# Patient Record
Sex: MALE | Race: WHITE | Employment: FULL TIME | URBAN - METROPOLITAN AREA
[De-identification: names, ages, dates, MRNs, and addresses within clinical notes are randomized per-mention and may not be internally consistent; named-entity substitution may affect disease eponyms.]

---

## 2017-01-25 DIAGNOSIS — Z79.891 LONG-TERM CURRENT USE OF OPIATE ANALGESIC: Chronic | ICD-10-CM

## 2017-01-25 DIAGNOSIS — M19.019 AC (ACROMIOCLAVICULAR) JOINT ARTHRITIS: Primary | ICD-10-CM

## 2017-01-25 RX ORDER — HYDROCODONE BITARTRATE AND ACETAMINOPHEN 7.5; 325 MG/1; MG/1
1 TABLET ORAL EVERY 8 HOURS PRN
Qty: 80 TABLET | Refills: 0 | Status: SHIPPED | OUTPATIENT
Start: 2017-01-25 | End: 2017-02-27

## 2017-02-27 DIAGNOSIS — Z79.891 LONG-TERM CURRENT USE OF OPIATE ANALGESIC: Chronic | ICD-10-CM

## 2017-02-27 DIAGNOSIS — M19.019 AC (ACROMIOCLAVICULAR) JOINT ARTHRITIS: ICD-10-CM

## 2017-02-27 DIAGNOSIS — G47.00 PERSISTENT DISORDER OF INITIATING OR MAINTAINING SLEEP: ICD-10-CM

## 2017-02-27 RX ORDER — HYDROCODONE BITARTRATE AND ACETAMINOPHEN 7.5; 325 MG/1; MG/1
1 TABLET ORAL EVERY 8 HOURS PRN
Qty: 75 TABLET | Refills: 0 | Status: SHIPPED | OUTPATIENT
Start: 2017-02-27 | End: 2017-03-23

## 2017-02-27 RX ORDER — DIAZEPAM 10 MG
TABLET ORAL
Qty: 26 TABLET | Refills: 0 | Status: SHIPPED | OUTPATIENT
Start: 2017-02-27 | End: 2017-03-23

## 2017-03-23 ENCOUNTER — OFFICE VISIT (OUTPATIENT)
Dept: FAMILY MEDICINE | Facility: CLINIC | Age: 52
End: 2017-03-23
Payer: COMMERCIAL

## 2017-03-23 VITALS
TEMPERATURE: 98.6 F | WEIGHT: 175 LBS | BODY MASS INDEX: 26.52 KG/M2 | DIASTOLIC BLOOD PRESSURE: 86 MMHG | HEIGHT: 68 IN | SYSTOLIC BLOOD PRESSURE: 138 MMHG | HEART RATE: 92 BPM

## 2017-03-23 DIAGNOSIS — M19.019 AC (ACROMIOCLAVICULAR) JOINT ARTHRITIS: ICD-10-CM

## 2017-03-23 DIAGNOSIS — J30.1 ALLERGIC RHINITIS DUE TO POLLEN, UNSPECIFIED RHINITIS SEASONALITY: Primary | ICD-10-CM

## 2017-03-23 DIAGNOSIS — G47.00 PERSISTENT DISORDER OF INITIATING OR MAINTAINING SLEEP: ICD-10-CM

## 2017-03-23 DIAGNOSIS — Z79.891 LONG-TERM CURRENT USE OF OPIATE ANALGESIC: Chronic | ICD-10-CM

## 2017-03-23 DIAGNOSIS — I10 BENIGN ESSENTIAL HYPERTENSION: ICD-10-CM

## 2017-03-23 PROCEDURE — 99214 OFFICE O/P EST MOD 30 MIN: CPT | Performed by: FAMILY MEDICINE

## 2017-03-23 RX ORDER — DIAZEPAM 10 MG
TABLET ORAL
Qty: 26 TABLET | Refills: 0 | Status: SHIPPED | OUTPATIENT
Start: 2017-03-23 | End: 2017-06-15

## 2017-03-23 RX ORDER — FLUTICASONE PROPIONATE 50 MCG
1-2 SPRAY, SUSPENSION (ML) NASAL DAILY
Qty: 1 BOTTLE | Refills: 11 | Status: SHIPPED | OUTPATIENT
Start: 2017-03-23 | End: 2017-11-15 | Stop reason: ALTCHOICE

## 2017-03-23 RX ORDER — ATENOLOL 25 MG/1
25 TABLET ORAL DAILY
Qty: 90 TABLET | Refills: 3 | Status: SHIPPED | OUTPATIENT
Start: 2017-03-23 | End: 2017-09-21

## 2017-03-23 RX ORDER — HYDROCODONE BITARTRATE AND ACETAMINOPHEN 7.5; 325 MG/1; MG/1
1 TABLET ORAL EVERY 8 HOURS PRN
Qty: 70 TABLET | Refills: 0 | Status: SHIPPED | OUTPATIENT
Start: 2017-03-23 | End: 2017-03-23

## 2017-03-23 RX ORDER — HYDROCODONE BITARTRATE AND ACETAMINOPHEN 7.5; 325 MG/1; MG/1
1 TABLET ORAL EVERY 8 HOURS PRN
Qty: 70 TABLET | Refills: 0 | Status: SHIPPED | OUTPATIENT
Start: 2017-04-21 | End: 2017-05-09

## 2017-03-23 RX ORDER — CETIRIZINE HYDROCHLORIDE 10 MG/1
10 TABLET ORAL EVERY EVENING
Qty: 30 TABLET | Refills: 1 | Status: SHIPPED | OUTPATIENT
Start: 2017-03-23 | End: 2020-04-29

## 2017-03-23 RX ORDER — LOSARTAN POTASSIUM AND HYDROCHLOROTHIAZIDE 12.5; 5 MG/1; MG/1
1 TABLET ORAL DAILY
Qty: 90 TABLET | Refills: 3 | Status: SHIPPED | OUTPATIENT
Start: 2017-03-23 | End: 2017-09-14

## 2017-03-23 NOTE — MR AVS SNAPSHOT
After Visit Summary   3/23/2017    Sunny Samaniego    MRN: [id number]           Patient Information     Date Of Birth          1965        Visit Information        Provider Department      3/23/2017 8:00 AM Talia Bhandari MD [facility]        Today's Diagnoses     Allergic rhinitis due to pollen, unspecified rhinitis seasonality    -  1    Benign essential hypertension        AC (acromioclavicular) joint arthritis        Long-term current use of opiate analgesic        Persistent disorder of initiating or maintaining sleep          Care Instructions    You can take the zyrtec 2 times per day if needed for allergies   Start the flonase also   Take the atenolol every day for blood pressure this may help with the headaches also if you take it consistently   Take the losartan - hydrochlorothiazide every day for blood pressure   Take the Nexium every day   I know you will not forget your norco            Follow-ups after your visit        Who to contact     Normal or non-critical lab and imaging results will be communicated to you by MYFX[employer], letter or phone within 4 business days after the clinic has received the results. If you do not hear from us within 7 days, please contact the clinic through MYFX[employer] or phone. If you have a critical or abnormal lab result, we will notify you by phone as soon as possible.  Submit refill requests through [employer] or call your pharmacy and they will forward the refill request to us. Please allow 3 business days for your refill to be completed.          If you need to speak with a  for additional information , please call: [phone]             Additional Information About Your Visit        [employer] Information     [employer] gives you secure access to your electronic health record. If you see a primary care provider, you can also send messages to your care team and make appointments. If you have questions, please call your primary care  "clinic.  If you do not have a primary care provider, please call [phone] and they will assist you.        Care EveryWhere ID     This is your Care EveryWhere ID. This could be used by other organizations to access your [facility] medical records  [id number]        Your Vitals Were     Pulse Height BMI (Body Mass Index)             92 5' 8\" (1.727 m) 26.61 kg/m2          Blood Pressure from Last 3 Encounters:   03/23/17 138/86   12/01/16 124/86   08/24/16 126/60    Weight from Last 3 Encounters:   03/23/17 175 lb (79.4 kg)   12/01/16 176 lb (79.8 kg)   08/23/16 185 lb (83.9 kg)              Today, you had the following     No orders found for display         Today's Medication Changes          These changes are accurate as of: 3/23/17  9:05 AM.  If you have any questions, ask your nurse or doctor.               Start taking these medicines.        Dose/Directions    atenolol 25 MG tablet   Commonly known as:  TENORMIN   Used for:  Benign essential hypertension, Allergic rhinitis due to pollen, unspecified rhinitis seasonality   Started by:  Talia Bhandari MD        Dose:  25 mg   Take 1 tablet (25 mg) by mouth daily   Quantity:  90 tablet   Refills:  3       cetirizine 10 MG tablet   Commonly known as:  zyrTEC   Used for:  Allergic rhinitis due to pollen, unspecified rhinitis seasonality   Started by:  Talia Bhandari MD        Dose:  10 mg   Take 1 tablet (10 mg) by mouth every evening   Quantity:  30 tablet   Refills:  1       fluticasone 50 MCG/ACT spray   Commonly known as:  FLONASE   Used for:  Allergic rhinitis due to pollen, unspecified rhinitis seasonality   Started by:  Talia Bhandari MD        Dose:  1-2 spray   Spray 1-2 sprays into both nostrils daily   Quantity:  1 Bottle   Refills:  11       HYDROcodone-acetaminophen 7.5-325 MG per tablet   Commonly known as:  NORCO   Used for:  AC (acromioclavicular) joint arthritis, Long-term current use of opiate analgesic   Started by:  Talia Bhandari" MD GELY        Dose:  1 tablet   Start taking on:  4/21/2017   Take 1 tablet by mouth every 8 hours as needed for moderate to severe pain   Quantity:  70 tablet   Refills:  0         Stop taking these medicines if you haven't already. Please contact your care team if you have questions.     DULoxetine 30 MG EC capsule   Commonly known as:  CYMBALTA   Stopped by:  Talia Bhandari MD                Where to get your medicines      These medications were sent to [facility] [address] [facility]  [address] [facility] [address]     Phone:  [phone]     atenolol 25 MG tablet    cetirizine 10 MG tablet    fluticasone 50 MCG/ACT spray    losartan-hydrochlorothiazide 50-12.5 MG per tablet         Some of these will need a paper prescription and others can be bought over the counter.  Ask your nurse if you have questions.     Bring a paper prescription for each of these medications     diazepam 10 MG tablet    HYDROcodone-acetaminophen 7.5-325 MG per tablet                Primary Care Provider Office Phone # Fax #    Talia Bhandari [phone]       [facility] [address] [facility] [address]        Thank you!     Thank you for choosing [facility]  for your care. Our goal is always to provide you with excellent care. Hearing back from our patients is one way we can continue to improve our services. Please take a few minutes to complete the written survey that you may receive in the mail after your visit with us. Thank you!             Your Updated Medication List - Protect others around you: Learn how to safely use, store and throw away your medicines at www.disposemymeds.org.          This list is accurate as of: 3/23/17  9:05 AM.  Always use your most recent med list.                   Brand Name Dispense Instructions for use    atenolol 25 MG tablet    TENORMIN    90 tablet    Take 1 tablet (25 mg) by mouth daily       cetirizine 10 MG  tablet    zyrTEC    30 tablet    Take 1 tablet (10 mg) by mouth every evening       diazepam 10 MG tablet    VALIUM    26 tablet    Take one tablet by mouth nightly as needed for anxiety       fluticasone 50 MCG/ACT spray    FLONASE    1 Bottle    Spray 1-2 sprays into both nostrils daily       HYDROcodone-acetaminophen 7.5-325 MG per tablet   Start taking on:  4/21/2017    NORCO    70 tablet    Take 1 tablet by mouth every 8 hours as needed for moderate to severe pain       losartan-hydrochlorothiazide 50-12.5 MG per tablet    HYZAAR    90 tablet    Take 1 tablet by mouth daily       NEXIUM PO      Take 20 mg by mouth daily

## 2017-03-23 NOTE — PATIENT INSTRUCTIONS
You can take the zyrtec 2 times per day if needed for allergies   Start the flonase also   Take the atenolol every day for blood pressure this may help with the headaches also if you take it consistently   Take the losartan - hydrochlorothiazide every day for blood pressure   Take the Nexium every day   I know you will not forget your norco

## 2017-03-23 NOTE — PROGRESS NOTES
SUBJECTIVE:                                                    Sunny Samaniego is a 51 year old male who presents to clinic today for the following health issues:    Medication recheck.    Problem list and histories reviewed & adjusted, as indicated.  Additional history: here for recheck   BP Readings from Last 6 Encounters:   03/23/17 (!) 144/98   12/01/16 124/86   08/24/16 126/60   08/03/16 122/84   06/02/16 (!) 138/98   06/01/16 (!) 150/106     Has a cold /allergy he has been taking claritin   He has congestion he did just get off work the blood pressure had been better but after working all night it is up  He will be getting more training and will be doing more at his job.  He is happy about this but [place] pain is the same some days are better than others       Patient Active Problem List   Diagnosis     Liver lesion     CARDIOVASCULAR SCREENING; LDL GOAL LESS THAN 130     HTN (hypertension)     Microscopic hematuria     Elevated fasting glucose     Anxiety state     Long-term current use of opiate analgesic     [facility]     AC (acromioclavicular) joint arthritis     Esophageal reflux     Acute upper GI bleed     Syncope     Pain in joint of right shoulder     Need for prophylactic vaccination and inoculation against influenza     Past Surgical History:   Procedure Laterality Date     CHOLECYSTECTOMY  2009     ESOPHAGOSCOPY, GASTROSCOPY, DUODENOSCOPY (EGD), COMBINED N/A 12/23/2014    Procedure: COMBINED ESOPHAGOSCOPY, GASTROSCOPY, DUODENOSCOPY (EGD), BIOPSY SINGLE OR MULTIPLE;  Surgeon: Mesfin Milian MD;  Location: [facility]       Social History   Substance Use Topics     Smoking status: Former Smoker     Packs/day: 0.00     Years: 20.00     Types: Cigarettes     Smokeless tobacco: Never Used      Comment: smokes 1/month     Alcohol use 0.0 - 1.2 oz/week     0 - 2 Standard drinks or equivalent per week     Family History  "  Problem Relation Age of Onset     C.A.D. Maternal Grandfather      Prostate Cancer Paternal Grandfather      Hypertension Mother      Hypertension Brother      DIABETES No family hx of            ROS:  Constitutional, HEENT, cardiovascular, pulmonary, gi and gu systems are negative, except as otherwise noted.    OBJECTIVE:                                                    /86  Pulse 92  Temp 98.6  F (37  C) (Tympanic)  Ht 5' 8\" (1.727 m)  Wt 175 lb (79.4 kg)  BMI 26.61 kg/m2 Body mass index is 26.61 kg/(m^2).   GENERAL APPEARANCE: alert, no distress and fatigued  NECK: no adenopathy, no asymmetry, masses, or scars and thyroid normal to palpation  RESP: lungs clear to auscultation - no rales, rhonchi or wheezes  CV: regular rates and rhythm, normal S1 S2, no S3 or S4 and no murmur, click or rub  ABDOMEN: soft, nontender, without hepatosplenomegaly or masses and bowel sounds normal  MS: arthritic changes of the rigth shoulder        ASSESSMENT/PLAN:                                                     reports that he has quit smoking. His smoking use included Cigarettes. He smoked 0.00 packs per day for 20.00 years. He has never used smokeless tobacco.    1. Benign essential hypertension    - losartan-hydrochlorothiazide (HYZAAR) 50-12.5 MG per tablet; Take 1 tablet by mouth daily  Dispense: 90 tablet; Refill: 3  - atenolol (TENORMIN) 25 MG tablet; Take 1 tablet (25 mg) by mouth daily  Dispense: 90 tablet; Refill: 3    2. Allergic rhinitis due to pollen, unspecified rhinitis seasonality    - atenolol (TENORMIN) 25 MG tablet; Take 1 tablet (25 mg) by mouth daily  Dispense: 90 tablet; Refill: 3  - cetirizine (ZYRTEC) 10 MG tablet; Take 1 tablet (10 mg) by mouth every evening  Dispense: 30 tablet; Refill: 1  - fluticasone (FLONASE) 50 MCG/ACT spray; Spray 1-2 sprays into both nostrils daily  Dispense: 1 Bottle; Refill: 11    3. AC (acromioclavicular) joint arthritis    - HYDROcodone-acetaminophen (NORCO) " 7.5-325 MG per tablet; Take 1 tablet by mouth every 8 hours as needed for moderate to severe pain  Dispense: 70 tablet; Refill: 0    4. Long-term current use of opiate analgesic    - HYDROcodone-acetaminophen (NORCO) 7.5-325 MG per tablet; Take 1 tablet by mouth every 8 hours as needed for moderate to severe pain  Dispense: 70 tablet; Refill: 0    5. Persistent disorder of initiating or maintaining sleep    - diazepam (VALIUM) 10 MG tablet; Take one tablet by mouth nightly as needed for anxiety  Dispense: 26 tablet; Refill: 0    Problems are stable. Med use stable follow up 3 months for recheck.   You can take the zyrtec 2 times per day if needed for allergies   Start the flonase also   Take the atenolol every day for blood pressure this may help with the headaches also if you take it consistently   Take the losartan - hydrochlorothiazide every day for blood pressure   Take the Nexium every day   I know you will not forget your norco  Talia Bhandari M.D.  [facility]

## 2017-03-23 NOTE — NURSING NOTE
"Chief Complaint   Patient presents with     Recheck Medication       Initial /86  Pulse 92  Ht 5' 8\" (1.727 m)  Wt 175 lb (79.4 kg)  BMI 26.61 kg/m2 Estimated body mass index is 26.61 kg/(m^2) as calculated from the following:    Height as of this encounter: 5' 8\" (1.727 m).    Weight as of this encounter: 175 lb (79.4 kg).  Medication Reconciliation: complete   Brissa Sabillon CMA    "

## 2017-05-09 DIAGNOSIS — M19.019 AC (ACROMIOCLAVICULAR) JOINT ARTHRITIS: ICD-10-CM

## 2017-05-09 DIAGNOSIS — Z79.891 LONG-TERM CURRENT USE OF OPIATE ANALGESIC: Chronic | ICD-10-CM

## 2017-05-09 RX ORDER — HYDROCODONE BITARTRATE AND ACETAMINOPHEN 7.5; 325 MG/1; MG/1
1 TABLET ORAL EVERY 8 HOURS PRN
Qty: 70 TABLET | Refills: 0 | Status: SHIPPED | OUTPATIENT
Start: 2017-05-19 | End: 2017-06-15

## 2017-05-09 NOTE — TELEPHONE ENCOUNTER
Sunny calling to get a refill on Norco dated for 5/19/17 -  It's due on the 21st of May, but that is a Sunday.  He was just trying to get it done before you leave.  No asking for early refill.  He will schedule an appointment when you return.    norco        Last Written Prescription Date:  4/21/17  Last Fill Quantity: 70,   # refills: 0  Last Office Visit with [facility] prescribing provider: 3/23/17  Future Office visit:       Routing refill request to provider for review/approval because:  Drug not on the [facility] refill protocol or controlled substance

## 2017-06-15 ENCOUNTER — OFFICE VISIT (OUTPATIENT)
Dept: FAMILY MEDICINE | Facility: CLINIC | Age: 52
End: 2017-06-15
Payer: COMMERCIAL

## 2017-06-15 VITALS
DIASTOLIC BLOOD PRESSURE: 82 MMHG | BODY MASS INDEX: 26.58 KG/M2 | SYSTOLIC BLOOD PRESSURE: 130 MMHG | WEIGHT: 175.4 LBS | HEIGHT: 68 IN | HEART RATE: 85 BPM

## 2017-06-15 DIAGNOSIS — Z79.891 LONG-TERM CURRENT USE OF OPIATE ANALGESIC: Chronic | ICD-10-CM

## 2017-06-15 DIAGNOSIS — M19.019 AC (ACROMIOCLAVICULAR) JOINT ARTHRITIS: ICD-10-CM

## 2017-06-15 PROCEDURE — 99213 OFFICE O/P EST LOW 20 MIN: CPT | Performed by: FAMILY MEDICINE

## 2017-06-15 RX ORDER — HYDROCODONE BITARTRATE AND ACETAMINOPHEN 7.5; 325 MG/1; MG/1
1 TABLET ORAL EVERY 8 HOURS PRN
Qty: 60 TABLET | Refills: 0 | Status: ON HOLD | OUTPATIENT
Start: 2017-08-11 | End: 2017-09-05

## 2017-06-15 RX ORDER — HYDROCODONE BITARTRATE AND ACETAMINOPHEN 7.5; 325 MG/1; MG/1
1 TABLET ORAL EVERY 8 HOURS PRN
Qty: 65 TABLET | Refills: 0 | Status: SHIPPED | OUTPATIENT
Start: 2017-06-15 | End: 2017-06-15

## 2017-06-15 RX ORDER — HYDROCODONE BITARTRATE AND ACETAMINOPHEN 7.5; 325 MG/1; MG/1
1 TABLET ORAL EVERY 8 HOURS PRN
Qty: 65 TABLET | Refills: 0 | Status: SHIPPED | OUTPATIENT
Start: 2017-07-13 | End: 2017-06-15

## 2017-06-15 ASSESSMENT — PATIENT HEALTH QUESTIONNAIRE - PHQ9: 5. POOR APPETITE OR OVEREATING: MORE THAN HALF THE DAYS

## 2017-06-15 ASSESSMENT — ANXIETY QUESTIONNAIRES
2. NOT BEING ABLE TO STOP OR CONTROL WORRYING: MORE THAN HALF THE DAYS
7. FEELING AFRAID AS IF SOMETHING AWFUL MIGHT HAPPEN: NOT AT ALL
GAD7 TOTAL SCORE: 7
1. FEELING NERVOUS, ANXIOUS, OR ON EDGE: NOT AT ALL
6. BECOMING EASILY ANNOYED OR IRRITABLE: NOT AT ALL
5. BEING SO RESTLESS THAT IT IS HARD TO SIT STILL: SEVERAL DAYS
3. WORRYING TOO MUCH ABOUT DIFFERENT THINGS: MORE THAN HALF THE DAYS

## 2017-06-15 NOTE — PROGRESS NOTES
SUBJECTIVE:                                                    Sunny Samaniego is a 51 year old male who presents to clinic today for the following health issues:    Pain Medication refill.     BP Readings from Last 6 Encounters:   06/15/17 130/82   03/23/17 138/86   12/01/16 124/86   08/24/16 126/60   08/03/16 122/84   06/02/16 (!) 138/98       Problem list and histories reviewed & adjusted, as indicated.  Additional history: he is here for follow up his wife is off work for 3 months she broke her foot after slipping down the stairs he has been weaning down on the pain pills so he is down to the 70 per month    He is working a lot and he works overnights   Mother-in -law had bladder cancer treatment     Has been tolerating the med decrease        Patient Active Problem List   Diagnosis     Liver lesion     CARDIOVASCULAR SCREENING; LDL GOAL LESS THAN 130     HTN (hypertension)     Microscopic hematuria     Elevated fasting glucose     Anxiety state     Long-term current use of opiate analgesic     [facility]     AC (acromioclavicular) joint arthritis     Esophageal reflux     Acute upper GI bleed     Syncope     Pain in joint of right shoulder     Need for prophylactic vaccination and inoculation against influenza     Past Surgical History:   Procedure Laterality Date     CHOLECYSTECTOMY  2009     ESOPHAGOSCOPY, GASTROSCOPY, DUODENOSCOPY (EGD), COMBINED N/A 12/23/2014    Procedure: COMBINED ESOPHAGOSCOPY, GASTROSCOPY, DUODENOSCOPY (EGD), BIOPSY SINGLE OR MULTIPLE;  Surgeon: Mesfin Milian MD;  Location: [facility]       Social History   Substance Use Topics     Smoking status: Former Smoker     Packs/day: 0.00     Years: 20.00     Types: Cigarettes     Smokeless tobacco: Never Used      Comment: smokes 1/month     Alcohol use 0.0 - 1.2 oz/week     0 - 2 Standard drinks or equivalent per week     Family History   Problem Relation Age of Onset     C.A.D. Maternal Grandfather      Prostate Cancer Paternal  "Grandfather      Hypertension Mother      Hypertension Brother      DIABETES No family hx of            ROS:  Constitutional, HEENT, cardiovascular, pulmonary, gi and gu systems are negative, except as otherwise noted.    OBJECTIVE:                                                    BP (!) 136/93 (BP Location: Right arm, Patient Position: Chair, Cuff Size: Adult Regular)  Pulse 85  Ht 5' 8\" (1.727 m)  Wt 175 lb 6.4 oz (79.6 kg)  BMI 26.67 kg/m2 Body mass index is 26.67 kg/(m^2).   GENERAL APPEARANCE: healthy, alert and no distress       ASSESSMENT/PLAN:                                                    1. AC (acromioclavicular) joint arthritis    - HYDROcodone-acetaminophen (NORCO) 7.5-325 MG per tablet; Take 1 tablet by mouth every 8 hours as needed for moderate to severe pain  Dispense: 60 tablet; Refill: 0    2. Long-term current use of opiate analgesic  Continuing to decrease the opioid use he is now down to less that 24 hour coverage   - HYDROcodone-acetaminophen (NORCO) 7.5-325 MG per tablet; Take 1 tablet by mouth every 8 hours as needed for moderate to severe pain  Dispense: 60 tablet; Refill: 0  mnprescriber checked today    reports that he has quit smoking. His smoking use included Cigarettes. He smoked 0.00 packs per day for 20.00 years. He has never used smokeless tobacco.    Return in 3 months for recheck  Blood pressure improved after 15 minutes        Talia Bhandari M.D.  [facility]    "

## 2017-06-15 NOTE — MR AVS SNAPSHOT
"              After Visit Summary   6/15/2017    Sunny Samaniego    MRN: [id number]           Patient Information     Date Of Birth          1965        Visit Information        Provider Department      6/15/2017 8:00 AM Talia Bhandari MD [facility]        Today's Diagnoses     AC (acromioclavicular) joint arthritis        Long-term current use of opiate analgesic           Follow-ups after your visit        Who to contact     Normal or non-critical lab and imaging results will be communicated to you by [employer], letter or phone within 4 business days after the clinic has received the results. If you do not hear from us within 7 days, please contact the clinic through [employer] or phone. If you have a critical or abnormal lab result, we will notify you by phone as soon as possible.  Submit refill requests through [employer] or call your pharmacy and they will forward the refill request to us. Please allow 3 business days for your refill to be completed.          If you need to speak with a  for additional information , please call: [phone]             Additional Information About Your Visit        [employer] Information     [employer] gives you secure access to your electronic health record. If you see a primary care provider, you can also send messages to your care team and make appointments. If you have questions, please call your primary care clinic.  If you do not have a primary care provider, please call [phone] and they will assist you.        Care EveryWhere ID     This is your Care EveryWhere ID. This could be used by other organizations to access your [facility] medical records  [id number]        Your Vitals Were     Pulse Height BMI (Body Mass Index)             85 5' 8\" (1.727 m) 26.67 kg/m2          Blood Pressure from Last 3 Encounters:   06/15/17 130/82   03/23/17 138/86   12/01/16 124/86    Weight from Last 3 Encounters:   06/15/17 175 lb 6.4 oz (79.6 kg)   03/23/17 " 175 lb (79.4 kg)   12/01/16 176 lb (79.8 kg)              Today, you had the following     No orders found for display         Today's Medication Changes          These changes are accurate as of: 6/15/17  5:43 PM.  If you have any questions, ask your nurse or doctor.               Start taking these medicines.        Dose/Directions    HYDROcodone-acetaminophen 7.5-325 MG per tablet   Commonly known as:  NORCO   Used for:  AC (acromioclavicular) joint arthritis, Long-term current use of opiate analgesic   Started by:  Talia Bhandari MD        Dose:  1 tablet   Start taking on:  8/11/2017   Take 1 tablet by mouth every 8 hours as needed for moderate to severe pain   Quantity:  60 tablet   Refills:  0            Where to get your medicines      Some of these will need a paper prescription and others can be bought over the counter.  Ask your nurse if you have questions.     Bring a paper prescription for each of these medications     HYDROcodone-acetaminophen 7.5-325 MG per tablet                Primary Care Provider Office Phone # Fax #    Talia Bhandari [phone]       [facility] [id number] [place] [id number]        Thank you!     Thank you for choosing [facility]  for your care. Our goal is always to provide you with excellent care. Hearing back from our patients is one way we can continue to improve our services. Please take a few minutes to complete the written survey that you may receive in the mail after your visit with us. Thank you!             Your Updated Medication List - Protect others around you: Learn how to safely use, store and throw away your medicines at www.disposemymeds.org.          This list is accurate as of: 6/15/17  5:43 PM.  Always use your most recent med list.                   Brand Name Dispense Instructions for use    atenolol 25 MG tablet    TENORMIN    90 tablet    Take 1 tablet (25 mg) by mouth daily       cetirizine 10 MG tablet     zyrTEC    30 tablet    Take 1 tablet (10 mg) by mouth every evening       fluticasone 50 MCG/ACT spray    FLONASE    1 Bottle    Spray 1-2 sprays into both nostrils daily       HYDROcodone-acetaminophen 7.5-325 MG per tablet   Start taking on:  8/11/2017    NORCO    60 tablet    Take 1 tablet by mouth every 8 hours as needed for moderate to severe pain       losartan-hydrochlorothiazide 50-12.5 MG per tablet    HYZAAR    90 tablet    Take 1 tablet by mouth daily       NEXIUM PO      Take 20 mg by mouth daily

## 2017-06-16 ASSESSMENT — ANXIETY QUESTIONNAIRES: GAD7 TOTAL SCORE: 7

## 2017-06-16 ASSESSMENT — PATIENT HEALTH QUESTIONNAIRE - PHQ9: SUM OF ALL RESPONSES TO PHQ QUESTIONS 1-9: 6

## 2017-08-21 ENCOUNTER — TELEPHONE (OUTPATIENT)
Dept: FAMILY MEDICINE | Facility: CLINIC | Age: 52
End: 2017-08-21

## 2017-08-21 NOTE — TELEPHONE ENCOUNTER
Reason for call:  Patient reporting a symptom    Symptom or request: Sunny is sorry that he missed his appointment today, however he was picking up his grandson from school and got delayed.  He has appointment with Dr. Melgoza on 8/23/17.  He is wanting to know if Dr. Bhandari would prescribed him medication for prostate infection.  States that it's the same thing that he had before (?).  Please call and assess. Thank you..Kat Hicks    Duration (how long have symptoms been present): did not state    Have you been treated for this before? Yes    Additional comments: none    Phone Number patient can be reached at:  Home number on file [phone] (home)    Best Time:  Any time    Can we leave a detailed message on this number:  YES    Call taken on 8/21/2017 at 3:50 PM by Kat Hicks

## 2017-08-22 ENCOUNTER — OFFICE VISIT (OUTPATIENT)
Dept: FAMILY MEDICINE | Facility: CLINIC | Age: 52
End: 2017-08-22
Payer: COMMERCIAL

## 2017-08-22 VITALS
SYSTOLIC BLOOD PRESSURE: 146 MMHG | DIASTOLIC BLOOD PRESSURE: 98 MMHG | BODY MASS INDEX: 26.55 KG/M2 | WEIGHT: 175.2 LBS | TEMPERATURE: 97.9 F | HEIGHT: 68 IN | HEART RATE: 100 BPM

## 2017-08-22 DIAGNOSIS — D62 ANEMIA DUE TO BLOOD LOSS, ACUTE: Primary | ICD-10-CM

## 2017-08-22 DIAGNOSIS — N41.1 CHRONIC PROSTATITIS: ICD-10-CM

## 2017-08-22 PROCEDURE — 99213 OFFICE O/P EST LOW 20 MIN: CPT | Performed by: FAMILY MEDICINE

## 2017-08-22 RX ORDER — CIPROFLOXACIN 500 MG/1
500 TABLET, FILM COATED ORAL 2 TIMES DAILY
Qty: 42 TABLET | Refills: 0 | Status: SHIPPED | OUTPATIENT
Start: 2017-08-22 | End: 2017-09-14

## 2017-08-22 NOTE — LETTER
[facility]  [address] [facility] [address]  [phone]        August 27, 2018    Sunny Samaniego  [address] [place] [id number]              Dear Sunny Samaniego    This is to remind you that your LAB is due.    You may call our office at [phone] to schedule an appointment.    Please disregard this notice if you have already had your labs drawn or made an appointment.        Sincerely,        Mark Melgoza MD

## 2017-08-22 NOTE — TELEPHONE ENCOUNTER
Patient reports that his prostrate seems infected again; intermittent pain and some drainage. Appointment made for this afternooon with Dr. Melgoza.  Bonilla Brown RN

## 2017-08-22 NOTE — PROGRESS NOTES
SUBJECTIVE:                                                    Sunny Samaniego is a 52 year old male who presents to clinic today for the following health issues:    **Here to talk about a prostate infection. He is having lower back pain and having a hard type urinating. Sometimes gets discharge after urinating. Has been ongoing for a couple weeks. He knows that it is an infection.  **Just a general check up.    Problem list and histories reviewed & adjusted, as indicated.  Additional history:     Patient Active Problem List   Diagnosis     Liver lesion     CARDIOVASCULAR SCREENING; LDL GOAL LESS THAN 130     HTN (hypertension)     Microscopic hematuria     Elevated fasting glucose     Anxiety state     Long-term current use of opiate analgesic     [facility]     AC (acromioclavicular) joint arthritis     Esophageal reflux     Acute upper GI bleed     Syncope     Pain in joint of right shoulder     Need for prophylactic vaccination and inoculation against influenza     Past Surgical History:   Procedure Laterality Date     CHOLECYSTECTOMY  2009     ESOPHAGOSCOPY, GASTROSCOPY, DUODENOSCOPY (EGD), COMBINED N/A 12/23/2014    Procedure: COMBINED ESOPHAGOSCOPY, GASTROSCOPY, DUODENOSCOPY (EGD), BIOPSY SINGLE OR MULTIPLE;  Surgeon: Mesfin Milian MD;  Location: [facility]       Social History   Substance Use Topics     Smoking status: Former Smoker     Packs/day: 0.00     Years: 20.00     Types: Cigarettes     Smokeless tobacco: Never Used      Comment: smokes 1/month     Alcohol use 0.0 - 1.2 oz/week     0 - 2 Standard drinks or equivalent per week     Family History   Problem Relation Age of Onset     C.A.D. Maternal Grandfather      Prostate Cancer Paternal Grandfather      Hypertension Mother      Hypertension Brother      DIABETES No family hx of            ROS:  Constitutional, HEENT, cardiovascular, pulmonary, gi and gu systems are negative, except as otherwise noted.      OBJECTIVE:                               "                      BP (!) 146/98 (BP Location: Right arm, Patient Position: Sitting, Cuff Size: Adult Large)  Pulse 100  Temp 97.9  F (36.6  C) (Tympanic)  Ht 5' 8\" (1.727 m)  Wt 175 lb 3.2 oz (79.5 kg)  BMI 26.64 kg/m2 Body mass index is 26.64 kg/(m^2).   GENERAL: healthy, alert, well nourished, well hydrated, no distress  HENT: ear canals- normal; TMs- normal; Nose- normal; Mouth- no ulcers, no lesions  NECK: no tenderness, no adenopathy, no asymmetry, no masses, no stiffness; thyroid- normal to palpation  RESP: lungs clear to auscultation - no rales, no rhonchi, no wheezes  CV: regular rates and rhythm, normal S1 S2, no S3 or S4 and no murmur, no click or rub -  ABDOMEN: soft, no tenderness, no  hepatosplenomegaly, no masses, normal bowel sounds       ASSESSMENT/PLAN:                                                      (N41.1) Chronic prostatitis  Plan: ciprofloxacin (CIPRO) 500 MG tablet     reports that he has quit smoking. His smoking use included Cigarettes. He smoked 0.00 packs per day for 20.00 years. He has never used smokeless tobacco.      [facility]  "

## 2017-08-22 NOTE — NURSING NOTE
"Chief Complaint   Patient presents with     RECHECK     general just up on his prostate       Initial BP (!) 146/98 (BP Location: Right arm, Patient Position: Sitting, Cuff Size: Adult Large)  Pulse 100  Temp 97.9  F (36.6  C) (Tympanic)  Ht 5' 8\" (1.727 m)  Wt 175 lb 3.2 oz (79.5 kg)  BMI 26.64 kg/m2 Estimated body mass index is 26.64 kg/(m^2) as calculated from the following:    Height as of this encounter: 5' 8\" (1.727 m).    Weight as of this encounter: 175 lb 3.2 oz (79.5 kg).  Medication Reconciliation: complete  "

## 2017-08-22 NOTE — MR AVS SNAPSHOT
"              After Visit Summary   8/22/2017    Sunny Samaniego    MRN: [id number]           Patient Information     Date Of Birth          1965        Visit Information        Provider Department      8/22/2017 3:20 PM Mark Mel[facility] MD [facility]        Today's Diagnoses     Anemia due to blood loss, acute    -  1    Chronic prostatitis           Follow-ups after your visit        Who to contact     Normal or non-critical lab and imaging results will be communicated to you by Movar[employer], letter or phone within 4 business days after the clinic has received the results. If you do not hear from us within 7 days, please contact the clinic through Movarishart or phone. If you have a critical or abnormal lab result, we will notify you by phone as soon as possible.  Submit refill requests through [employer] or call your pharmacy and they will forward the refill request to us. Please allow 3 business days for your refill to be completed.          If you need to speak with a  for additional information , please call: [phone]             Additional Information About Your Visit        [employer] Information     [employer] gives you secure access to your electronic health record. If you see a primary care provider, you can also send messages to your care team and make appointments. If you have questions, please call your primary care clinic.  If you do not have a primary care provider, please call [phone] and they will assist you.        Care EveryWhere ID     This is your Care EveryWhere ID. This could be used by other organizations to access your [place] medical records  [id number]        Your Vitals Were     Pulse Temperature Height BMI (Body Mass Index)          100 97.9  F (36.6  C) (Tympanic) 5' 8\" (1.727 m) 26.64 kg/m2         Blood Pressure from Last 3 Encounters:   08/22/17 (!) 146/98   06/15/17 130/82   03/23/17 138/86    Weight from Last 3 Encounters:   08/22/17 175 lb 3.2 oz " (79.5 kg)   06/15/17 175 lb 6.4 oz (79.6 kg)   03/23/17 175 lb (79.4 kg)                 Today's Medication Changes          These changes are accurate as of: 8/22/17 11:59 PM.  If you have any questions, ask your nurse or doctor.               Start taking these medicines.        Dose/Directions    ciprofloxacin 500 MG tablet   Commonly known as:  CIPRO   Used for:  Chronic prostatitis   Started by:  Mark Melgoza MD        Dose:  500 mg   Take 1 tablet (500 mg) by mouth 2 times daily   Quantity:  42 tablet   Refills:  0            Where to get your medicines      These medications were sent to [id number] - [place], [place] - [address]  [address], [facility] [address]     Phone:  [phone]     ciprofloxacin 500 MG tablet                Primary Care Provider Office Phone # Fax #    Talia Bhandari [phone] [address] [facility] [address]        Equal Access to Services     [facility] AH: Hadii aad ku hadasho Soomaali, waaxda luqadaha, qaybta kaalmada adeegyada, waxay idiin haychloen kateryna lloyd . So [facility] [phone].    ATENCIÓN: Si habla español, tiene a lozada disposición servicios gratuitos de asistencia lingüística. Llame al [phone].    We comply with applicable federal civil rights laws and [place] laws. We do not discriminate on the basis of race, color, national origin, age, disability sex, sexual orientation or gender identity.            Thank you!     Thank you for choosing [facility]  for your care. Our goal is always to provide you with excellent care. Hearing back from our patients is one way we can continue to improve our services. Please take a few minutes to complete the written survey that you may receive in the mail after your visit with us. Thank you!             Your Updated Medication List - Protect others around you: Learn how to safely use, store and throw away your medicines at  www.disposemymeds.org.          This list is accurate as of: 8/22/17 11:59 PM.  Always use your most recent med list.                   Brand Name Dispense Instructions for use Diagnosis    atenolol 25 MG tablet    TENORMIN    90 tablet    Take 1 tablet (25 mg) by mouth daily    Benign essential hypertension, Allergic rhinitis due to pollen, unspecified rhinitis seasonality       cetirizine 10 MG tablet    zyrTEC    30 tablet    Take 1 tablet (10 mg) by mouth every evening    Allergic rhinitis due to pollen, unspecified rhinitis seasonality       ciprofloxacin 500 MG tablet    CIPRO    42 tablet    Take 1 tablet (500 mg) by mouth 2 times daily    Chronic prostatitis       fluticasone 50 MCG/ACT spray    FLONASE    1 Bottle    Spray 1-2 sprays into both nostrils daily    Allergic rhinitis due to pollen, unspecified rhinitis seasonality       HYDROcodone-acetaminophen 7.5-325 MG per tablet    NORCO    60 tablet    Take 1 tablet by mouth every 8 hours as needed for moderate to severe pain    AC (acromioclavicular) joint arthritis, Long-term current use of opiate analgesic       losartan-hydrochlorothiazide 50-12.5 MG per tablet    HYZAAR    90 tablet    Take 1 tablet by mouth daily    Benign essential hypertension       NEXIUM PO      Take 20 mg by mouth daily

## 2017-08-30 ENCOUNTER — APPOINTMENT (OUTPATIENT)
Dept: CT IMAGING | Facility: CLINIC | Age: 52
End: 2017-08-30
Attending: EMERGENCY MEDICINE
Payer: COMMERCIAL

## 2017-08-30 ENCOUNTER — HOSPITAL ENCOUNTER (EMERGENCY)
Facility: CLINIC | Age: 52
Discharge: HOME | End: 2017-08-30
Attending: EMERGENCY MEDICINE | Admitting: EMERGENCY MEDICINE
Payer: COMMERCIAL

## 2017-08-30 ENCOUNTER — DOCUMENTATION ONLY (OUTPATIENT)
Dept: OPHTHALMOLOGY | Facility: CLINIC | Age: 52
End: 2017-08-30

## 2017-08-30 VITALS
DIASTOLIC BLOOD PRESSURE: 113 MMHG | RESPIRATION RATE: 16 BRPM | SYSTOLIC BLOOD PRESSURE: 165 MMHG | OXYGEN SATURATION: 98 % | TEMPERATURE: 98.8 F

## 2017-08-30 DIAGNOSIS — S02.32XA CLOSED FRACTURE OF LEFT ORBITAL FLOOR, INITIAL ENCOUNTER (H): Primary | ICD-10-CM

## 2017-08-30 DIAGNOSIS — H11.32 SUBCONJUNCTIVAL HEMORRHAGE OF LEFT EYE: ICD-10-CM

## 2017-08-30 DIAGNOSIS — S02.32XA: ICD-10-CM

## 2017-08-30 PROCEDURE — 99285 EMERGENCY DEPT VISIT HI MDM: CPT | Performed by: EMERGENCY MEDICINE

## 2017-08-30 PROCEDURE — 25000132 ZZH RX MED GY IP 250 OP 250 PS 637: Performed by: EMERGENCY MEDICINE

## 2017-08-30 PROCEDURE — 99284 EMERGENCY DEPT VISIT MOD MDM: CPT | Mod: 25

## 2017-08-30 PROCEDURE — 70486 CT MAXILLOFACIAL W/O DYE: CPT

## 2017-08-30 RX ORDER — OXYMETAZOLINE HYDROCHLORIDE 0.05 G/100ML
2-3 SPRAY NASAL 2 TIMES DAILY PRN
Qty: 1 BOTTLE | Refills: 0 | Status: SHIPPED | OUTPATIENT
Start: 2017-08-30 | End: 2017-09-02

## 2017-08-30 RX ADMIN — AMOXICILLIN AND CLAVULANATE POTASSIUM 1 TABLET: 875; 125 TABLET, FILM COATED ORAL at 06:14

## 2017-08-30 ASSESSMENT — VISUAL ACUITY
OS: 20/70
OD: 20/70

## 2017-08-30 NOTE — ED AVS SNAPSHOT
[facility] Emergency Department    [address] [facility] [address] [address]    Phone:  [phone]    Fax:  [phone]                                       Sunny Samaniego   MRN: [id number]    Department:  [facility] Emergency Department   Date of Visit:  8/30/2017           Patient Information     Date Of Birth          1965        Your diagnoses for this visit were:     Fracture of orbital floor, blow-out, left, closed, initial encounter (H)     Subconjunctival hemorrhage of left eye        You were seen by Aubrey Wagner MD.      Follow-up Information     Follow up with Chirag Aguiar MD. Call today.    Specialty:  Otolaryngology    Why:  To schedule follow up of your orbital floor fracture.    Contact information:    [address] [facility] [address] [phone]          Follow up with [facility]. Schedule an appointment as soon as possible for a visit in 1 week.    Why:  For re-evaluation of your eye    Contact information:    [address] [facility] [id number] [phone]        Discharge Instructions       Avoid blowing your nose, bending forward/leaning down, any straining/lifting, sneezing with a closed mouth.         Subconjunctival Hemorrhage    A subconjunctival hemorrhage is when a blood vessel breaks open in the white of the eye. It causes a bright red patch in the white of the eye. It is similar to a bruise on the skin. This type of hemorrhage is common. It can look quite alarming, but it is usually harmless.  Understanding the conjunctiva  The conjunctiva is the thin layer that covers the inside of the eyelids and the surface of the eye. It has many tiny blood vessels that bring oxygen and nutrients to the eye. The sclera is the white part of the eye that lies beneath the conjunctiva. Sometimes a blood vessel in the conjunctiva breaks open and bleeds. The blood then collects under the conjunctiva and turns part of the eye red. Over  several weeks, your body then absorbs the blood.  What causes subconjunctival hemorrhage?  In many cases the cause isn t known. But some health conditions may make it more likely. These include:    Eye injury    Eye surgery    High blood pressure    Inflammation of the conjunctiva    Contact lens use    Diabetes    Arteriosclerosis    Tumor of the conjunctiva    Diseases that affect blood clotting    Violent sneezing, coughing, or vomiting    Certain medicines that can increase bleeding, such as aspirin    Pushing hard during childbirth    Straining during constipation  Symptoms of subconjunctival hemorrhage  The main symptom is a red patch on the eye. You may notice it after waking up in the morning. In most cases just one eye will have a hemorrhage. It usually happens once and then goes away. But some health conditions may cause repeat hemorrhages. You may feel like you have something in your eye, but this is not common. The hemorrhage shouldn t affect your eyesight or cause any pain. If you do have pain, you may have another type of problem with your eye.  Diagnosing subconjunctival hemorrhage  Your healthcare provider will ask about your health history. You may have a physical exam. This includes a basic eye exam. Your provider will make sure you don t have other causes of red eye that may need other treatment.  You will need to see an eye doctor (ophthalmologist) if you have had an eye injury. This doctor might use a special lighted microscope to look closely at your eye. This helps show the doctor if the injury hurt the eye itself and not just its outer layer.  If this is not your first subconjunctival hemorrhage, your doctor may need to find the cause. For example, you may need blood tests to check for a blood clotting disorder.  Treatment for subconjunctival hemorrhage  In most cases you will not need treatment. The red patch will usually go away on its own in a few weeks. It will turn from red to brown and  then yellow. There are no treatments to speed up this process. Your doctor may suggest you use a warm compress and artificial tears eye drops to help relieve some of the redness.  If your subconjunctival hemorrhage was caused by a health condition, that condition will be treated. For example, you may need a blood pressure medicine to treat high blood pressure.  When to call your healthcare provider  Call your healthcare provider right away if you have any of these:    Hemorrhage that doesn t go away in 2 to 3 weeks    Eye pain    Loss of eyesight    Another subconjunctival hemorrhage    Date Last Reviewed: 2/1/2017 2000-2017 [employer] [address] [place] [address]. All rights reserved. This information is not intended as a substitute for professional medical care. Always follow your healthcare professional's instructions.      Avoid     Discharge References/Attachments     FRACTURE, FACIAL (ENGLISH)      24 Hour Appointment Hotline       To make an appointment at any [facility] clinic, call [phone] ([phone]). If you don't have a family doctor or clinic, we will help you find one. [facility] clinics are conveniently located to serve the needs of you and your family.             Review of your medicines      START taking        Dose / Directions Last dose taken    amoxicillin-clavulanate 875-125 MG per tablet   Commonly known as:  AUGMENTIN   Dose:  1 tablet   Quantity:  19 tablet        Take 1 tablet by mouth 2 times daily for 10 days   Refills:  0        oxymetazoline 0.05 % spray   Commonly known as:  AFRIN NASAL SPRAY   Dose:  2-3 spray   Quantity:  1 Bottle        Spray 2-3 sprays into both nostrils 2 times daily as needed for congestion   Refills:  0          Our records show that you are taking the medicines listed below. If these are incorrect, please call your family doctor or clinic.        Dose / Directions Last dose taken    atenolol 25 MG tablet   Commonly known  as:  TENORMIN   Dose:  25 mg   Quantity:  90 tablet        Take 1 tablet (25 mg) by mouth daily   Refills:  3        cetirizine 10 MG tablet   Commonly known as:  zyrTEC   Dose:  10 mg   Quantity:  30 tablet        Take 1 tablet (10 mg) by mouth every evening   Refills:  1        ciprofloxacin 500 MG tablet   Commonly known as:  CIPRO   Dose:  500 mg   Quantity:  42 tablet        Take 1 tablet (500 mg) by mouth 2 times daily   Refills:  0        fluticasone 50 MCG/ACT spray   Commonly known as:  FLONASE   Dose:  1-2 spray   Quantity:  1 Bottle        Spray 1-2 sprays into both nostrils daily   Refills:  11        HYDROcodone-acetaminophen 7.5-325 MG per tablet   Commonly known as:  NORCO   Dose:  1 tablet   Quantity:  60 tablet        Take 1 tablet by mouth every 8 hours as needed for moderate to severe pain   Refills:  0        losartan-hydrochlorothiazide 50-12.5 MG per tablet   Commonly known as:  HYZAAR   Dose:  1 tablet   Quantity:  90 tablet        Take 1 tablet by mouth daily   Refills:  3        NEXIUM PO   Dose:  20 mg        Take 20 mg by mouth daily   Refills:  0                Prescriptions were sent or printed at these locations (2 Prescriptions)                   [facility] [address], [facility] [address], [place] [address]    Telephone:  [phone]   Fax:  [phone]   Hours:                  E-Prescribed (2 of 2)         amoxicillin-clavulanate (AUGMENTIN) 875-125 MG per tablet               oxymetazoline (AFRIN NASAL SPRAY) 0.05 % spray                Procedures and tests performed during your visit     CT Maxillofacial w/o Contrast      Orders Needing Specimen Collection     None      Pending Results     No orders found from 8/28/2017 to 8/31/2017.            Pending Culture Results     No orders found from 8/28/2017 to 8/31/2017.            Pending Results Instructions     If you had any lab results that were not finalized at the time  of your Discharge, you can call the ED Lab Result RN at [phone]. You will be contacted by this team for any positive Lab results or changes in treatment. The nurses are available 7 days a week from 10A to 6:30P.  You can leave a message 24 hours per day and they will return your call.        Test Results From Your Hospital Stay        8/30/2017  6:30 AM      Narrative     CT MAXILLOFACIAL W/O CONTRAST  8/30/2017 5:49 AM      HISTORY: Facial trauma.    TECHNIQUE: Multiplanar imaging of the facial bones without intravenous  contrast. Radiation dose for this scan was reduced using automated  exposure control, adjustment of the mA and/or kV according to patient  size, or iterative reconstruction technique.     COMPARISON: None.    FINDINGS: There is a left orbital floor fracture which is depressed  approximately 1 cm. Left orbital fat is herniated into the left  maxillary sinus. There is air in the left orbit and periorbital  tissues. There is no other acute bone fracture. There is mucosal  thickening in all of the paranasal sinuses. Fluid level in the left  maxillary sinus.        Impression     IMPRESSION: Left orbital floor fracture.    JOCY GALLARDO MD                Thank you for choosing [facility]       Thank you for choosing [facility] for your care. Our goal is always to provide you with excellent care. Hearing back from our patients is one way we can continue to improve our services. Please take a few minutes to complete the written survey that you may receive in the mail after you visit with us. Thank you!        [employer] Information     [employer] gives you secure access to your electronic health record. If you see a primary care provider, you can also send messages to your care team and make appointments. If you have questions, please call your primary care clinic.  If you do not have a primary care provider, please call [phone] and they will assist you.        Care EveryWhere ID     This is your Care  EveryWhere ID. This could be used by other organizations to access your [place] medical records  [id number]        Equal Access to Services     MARY MORSE : Tona Cordon, jorge ko, lisha cherry, shannon cassidy. So [facility] [phone].    ATENCIÓN: Si habla español, tiene a lozada disposición servicios gratuitos de asistencia lingüística. Llame al [phone].    We comply with applicable federal civil rights laws and [place] laws. We do not discriminate on the basis of race, color, national origin, age, disability sex, sexual orientation or gender identity.            After Visit Summary       This is your record. Keep this with you and show to your community pharmacist(s) and doctor(s) at your next visit.

## 2017-08-30 NOTE — ED PROVIDER NOTES
"  History     Chief Complaint   Patient presents with     Facial Injury     patient has a swelling around left eye.  Patient was putting some boards on a shelf and they fell down and hit the left side of face yesterday at 0800     HPI  Sunny Samaniego is a 52 year old male with history of hypertension presents for evaluation of facial injury.  Patient reports that yesterday morning around 8 AM he was placing some items on a high shelf in his shed when a 4 x 4 fell off an upper shelf and hit him on the left cheek below his eye.  Denies loss of consciousness but states he \"saw stars\".  Patient reports he placed ice on it and managed to sleep through the day.  Tonight while at work patient had a blow his nose due to sinus congestion and had a rapid increase in the swelling around his eye with increasing pain.  Denies diplopia but does report his vision seems slightly blurred.  He denies actual pain of the eye itself.    I have reviewed the Medications, Allergies, Past Medical and Surgical History, and Social History in the Epic system.    Allergies:   Allergies   Allergen Reactions     Lisinopril Cough         No current facility-administered medications on file prior to encounter.   Current Outpatient Prescriptions on File Prior to Encounter:  losartan-hydrochlorothiazide (HYZAAR) 50-12.5 MG per tablet Take 1 tablet by mouth daily   atenolol (TENORMIN) 25 MG tablet Take 1 tablet (25 mg) by mouth daily   cetirizine (ZYRTEC) 10 MG tablet Take 1 tablet (10 mg) by mouth every evening   Esomeprazole Magnesium (NEXIUM PO) Take 20 mg by mouth daily    ciprofloxacin (CIPRO) 500 MG tablet Take 1 tablet (500 mg) by mouth 2 times daily   HYDROcodone-acetaminophen (NORCO) 7.5-325 MG per tablet Take 1 tablet by mouth every 8 hours as needed for moderate to severe pain   fluticasone (FLONASE) 50 MCG/ACT spray Spray 1-2 sprays into both nostrils daily (Patient not taking: Reported on 8/22/2017)       Patient Active Problem List " "  Diagnosis     Liver lesion     CARDIOVASCULAR SCREENING; LDL GOAL LESS THAN 130     HTN (hypertension)     Microscopic hematuria     Elevated fasting glucose     Anxiety state     Long-term current use of opiate analgesic     [facility]     AC (acromioclavicular) joint arthritis     Esophageal reflux     Acute upper GI bleed     Syncope     Pain in joint of right shoulder     Need for prophylactic vaccination and inoculation against influenza       Past Surgical History:   Procedure Laterality Date     CHOLECYSTECTOMY  2009     ESOPHAGOSCOPY, GASTROSCOPY, DUODENOSCOPY (EGD), COMBINED N/A 12/23/2014    Procedure: COMBINED ESOPHAGOSCOPY, GASTROSCOPY, DUODENOSCOPY (EGD), BIOPSY SINGLE OR MULTIPLE;  Surgeon: Mesfin Milian MD;  Location: [facility]       Social History   Substance Use Topics     Smoking status: Former Smoker     Packs/day: 0.00     Years: 20.00     Types: Cigarettes     Smokeless tobacco: Never Used      Comment: smokes 1/month     Alcohol use 0.0 - 1.2 oz/week     0 - 2 Standard drinks or equivalent per week       Most Recent Immunizations   Administered Date(s) Administered     Influenza Vaccine IM 3yrs+ 4 Valent IIV4 10/20/2014     Tdap (Adacel,Boostrix) 02/22/2013       BMI: Estimated body mass index is 26.64 kg/(m^2) as calculated from the following:    Height as of 8/22/17: 1.727 m (5' 8\").    Weight as of 8/22/17: 79.5 kg (175 lb 3.2 oz).      Review of Systems   Constitutional: Negative for appetite change, chills and fever.   HENT: Negative for dental problem.         Numbness to left cheek area and left upper teeth     Eyes: Positive for redness and visual disturbance (slight blurring). Negative for photophobia, pain and discharge.   Respiratory: Negative for shortness of breath.    Cardiovascular: Negative for chest pain.   Gastrointestinal: Negative for abdominal pain.   Musculoskeletal: Negative for back pain and neck pain.   Neurological: Positive for numbness (left face). Negative for " dizziness, light-headedness and headaches.   All other systems reviewed and are negative.      Physical Exam   BP: (!) 147/108  Heart Rate: 95  Temp: 98.8  F (37.1  C)  Resp: 16  SpO2: 98 %  Physical Exam   Constitutional: He appears well-developed and well-nourished. No distress.   HENT:   Head: Normocephalic.   Eyes: Pupils are equal, round, and reactive to light. Left conjunctiva has a hemorrhage. Right eye exhibits normal extraocular motion. Left eye exhibits normal extraocular motion.       Nursing note and vitals reviewed.      ED Course     ED Course     Procedures        Results for orders placed or performed during the hospital encounter of 08/30/17   CT Maxillofacial w/o Contrast    Narrative    CT MAXILLOFACIAL W/O CONTRAST  8/30/2017 5:49 AM      HISTORY: Facial trauma.    TECHNIQUE: Multiplanar imaging of the facial bones without intravenous  contrast. Radiation dose for this scan was reduced using automated  exposure control, adjustment of the mA and/or kV according to patient  size, or iterative reconstruction technique.     COMPARISON: None.    FINDINGS: There is a left orbital floor fracture which is depressed  approximately 1 cm. Left orbital fat is herniated into the left  maxillary sinus. There is air in the left orbit and periorbital  tissues. There is no other acute bone fracture. There is mucosal  thickening in all of the paranasal sinuses. Fluid level in the left  maxillary sinus.      Impression    IMPRESSION: Left orbital floor fracture.    JOCY GALLARDO MD         Assessments & Plan (with Medical Decision Making)  52-year-old male patient in for evaluation of pain and swelling around his left thigh after an injury yesterday morning when a 4 x 4 fell and struck him on the left cheek.  No loss of consciousness.  No neck pain.  Patient treated symptomatically at home until he was at work tonight and blew his nose and had immediate worsening swelling and pressure sensation around his eye.   Patient does report mild blurriness positional daily is normal.  Denies diplopia.  Symptoms suggestive of an orbital floor fracture.  CT confirmed the presence of an orbital floor fracture with connection in the maxillary sinus and air present throughout the orbit.  Patient counseled regarding symptomatically treatment to avoid worsening symptoms.  Started on prophylactic antibiotics.  Provided follow-up for ENT surgery at the  as well as ophthalmology for follow-up within the next few days to reassess symptoms and evaluate for possible surgical repair if needed.       I have reviewed the nursing notes.    I have reviewed the findings, diagnosis, plan and need for follow up with the patient.       Discharge Medication List as of 8/30/2017  6:48 AM      START taking these medications    Details   amoxicillin-clavulanate (AUGMENTIN) 875-125 MG per tablet Take 1 tablet by mouth 2 times daily for 10 days, Disp-19 tablet, R-0, E-Prescribe      oxymetazoline (AFRIN NASAL SPRAY) 0.05 % spray Spray 2-3 sprays into both nostrils 2 times daily as needed for congestion, Disp-1 Bottle, R-0, E-PrescribeUse for no more than 3 days             Final diagnoses:   Fracture of orbital floor, blow-out, left, closed, initial encounter (H)   Subconjunctival hemorrhage of left eye       8/30/2017   [facility] EMERGENCY DEPARTMENT     Wagner, Aubrey Touer MD  08/31/17 8609

## 2017-08-30 NOTE — NURSING NOTE
In basket sent to ENT triage from the call center regarding this patients eye fracture that happened yesterday. Fortunately, Dr Aguiar was in clinic today and was able to review the CT scan the patient had in the ER just today. Based on that, he is recommending a surgical repair of the fracture. Orders were placed and the patient was called with pre-op teaching. The surgery will be scheduled next week based on OR availability.   Gordo EnnisRN  [phone]    Relevant Diagnosis: left eye blow out fracture  Teaching Topic: open reduction of left blow out fracture  Person(s) involved in teaching: Patient     Teaching Concerns Addressed:  Pre op teaching included the need for an H&P, NPO status pre op, hospital routines, expected recovery, activity  restrictions, antimicrobial scrub, s/s of infection, pain control methods and the importance of follow up appointments.  The patient voiced an understanding of all instructions and will call with questions.     Motivation Level:  Asks Questions:   Yes  Eager to Learn:   Yes  Cooperative:   Yes  Receptive (willing/able to accept information):   Yes     Patient  demonstrates understanding of the following:  Reason for the appointment, diagnosis and treatment plan:   Yes  Knowledge of proper use of medications and conditions for which they are ordered (with special attention to potential side effects or drug interactions):   Yes  Which situations necessitate calling provider and whom to contact:   Yes        Proper use and care of  (medical equip, care aids, etc.):   NA  Nutritional needs and diet plan:   Yes  Pain management techniques:   Yes  Patient instructed on hand hygiene:  Yes  How and/when to access community resources:   NA     Infection Prevention:  Patient   demonstrates understanding of the following:  Surgical procedure site care taught   Signs and symptoms of infection taught Yes  Wound care taught Yes     Instructional Materials Used/Given: Pre op  booklet.

## 2017-08-30 NOTE — PROGRESS NOTES
OPHTHALMOLOGY CONSULT NOTE    Date:  2017    Patient:  Sunny Samaniego  :  1965    Referring Provider:    Talia Bhandari    Reason for Consult:    Blunt trauma OS with orbital floor fracture.  Patient struck with 4x4 wood yesterday AM.  Noted significant increase in periorbital swelling last evening when he blew nose.  Patient denies diplopia.    Exam:  Documented in medical record.  Vison 20/40 ou without correction.  Significant ecchymosis and swelling / emphysema left periorbita.  Motility is full except in far upgaze where there is 1-2 prism diopter right hypertopia, and downgaze with 1 prism diopter of esotropia.  Slit lamp exam shows no ocular injury and fundus is normal.    CT scan shows left orbital floor fracture approximately 50%.  The inferior rectus is not in the fracture site.    Impression:    1. Left orbital floor fracture.  Periorbital swelling / emphysema.  Currently borderline indication for orbital surgery.  The slight motility restriction will likely disappear as swelling resolves.  The fracture size is borderline indication to reduce risk for enophthalmos.  2. No signs of ocular injury.    Plan:    1. Continue oral antibiotics.  No nose blowing.  2. Observation indicated at this time.  Re-evaluate in 1 week.  If restriction continues, reasonable to proceed with orbital floor fracture repair.      Thank you for allowing me to participate in the care of your patient.        Sunny Haider MD  [facility]  [phone]

## 2017-08-30 NOTE — ED NOTES
"patient has a swelling around left eye.  Patient was putting some boards on a shelf and they fell down and hit the left side of face yesterday at 0800.  Patient has a headache with no nausea, right sided jaw pain and and upper right teeth feel numb.  Denies any loss of consciousness, but states, \"I saw stars\"  "

## 2017-08-30 NOTE — PATIENT INSTRUCTIONS
Nurse teaching given on surgical repair of left eye blow out fracture and the patient expresses understanding and acceptance of instructions. Gordo Ennis 8/30/2017 1:51 PM

## 2017-08-30 NOTE — LETTER
To Whom it may concern:      Sunny Samaniego was seen in our Emergency Department today, 08/30/17.  I expect his condition to improve over the next 1-2 weeks.  He may return to work today but should avoid lifting or bending until his symptoms resolve.    Sincerely,        Aubrey Wagner MD

## 2017-08-30 NOTE — DISCHARGE INSTRUCTIONS
Avoid blowing your nose, bending forward/leaning down, any straining/lifting, sneezing with a closed mouth.         Subconjunctival Hemorrhage    A subconjunctival hemorrhage is when a blood vessel breaks open in the white of the eye. It causes a bright red patch in the white of the eye. It is similar to a bruise on the skin. This type of hemorrhage is common. It can look quite alarming, but it is usually harmless.  Understanding the conjunctiva  The conjunctiva is the thin layer that covers the inside of the eyelids and the surface of the eye. It has many tiny blood vessels that bring oxygen and nutrients to the eye. The sclera is the white part of the eye that lies beneath the conjunctiva. Sometimes a blood vessel in the conjunctiva breaks open and bleeds. The blood then collects under the conjunctiva and turns part of the eye red. Over several weeks, your body then absorbs the blood.  What causes subconjunctival hemorrhage?  In many cases the cause isn t known. But some health conditions may make it more likely. These include:    Eye injury    Eye surgery    High blood pressure    Inflammation of the conjunctiva    Contact lens use    Diabetes    Arteriosclerosis    Tumor of the conjunctiva    Diseases that affect blood clotting    Violent sneezing, coughing, or vomiting    Certain medicines that can increase bleeding, such as aspirin    Pushing hard during childbirth    Straining during constipation  Symptoms of subconjunctival hemorrhage  The main symptom is a red patch on the eye. You may notice it after waking up in the morning. In most cases just one eye will have a hemorrhage. It usually happens once and then goes away. But some health conditions may cause repeat hemorrhages. You may feel like you have something in your eye, but this is not common. The hemorrhage shouldn t affect your eyesight or cause any pain. If you do have pain, you may have another type of problem with your eye.  Diagnosing  subconjunctival hemorrhage  Your healthcare provider will ask about your health history. You may have a physical exam. This includes a basic eye exam. Your provider will make sure you don t have other causes of red eye that may need other treatment.  You will need to see an eye doctor (ophthalmologist) if you have had an eye injury. This doctor might use a special lighted microscope to look closely at your eye. This helps show the doctor if the injury hurt the eye itself and not just its outer layer.  If this is not your first subconjunctival hemorrhage, your doctor may need to find the cause. For example, you may need blood tests to check for a blood clotting disorder.  Treatment for subconjunctival hemorrhage  In most cases you will not need treatment. The red patch will usually go away on its own in a few weeks. It will turn from red to brown and then yellow. There are no treatments to speed up this process. Your doctor may suggest you use a warm compress and artificial tears eye drops to help relieve some of the redness.  If your subconjunctival hemorrhage was caused by a health condition, that condition will be treated. For example, you may need a blood pressure medicine to treat high blood pressure.  When to call your healthcare provider  Call your healthcare provider right away if you have any of these:    Hemorrhage that doesn t go away in 2 to 3 weeks    Eye pain    Loss of eyesight    Another subconjunctival hemorrhage    Date Last Reviewed: 2/1/2017 2000-2017 [employer]. [address] [place] [address], [place] [address]. All rights reserved. This information is not intended as a substitute for professional medical care. Always follow your healthcare professional's instructions.      Avoid

## 2017-08-30 NOTE — ED AVS SNAPSHOT
[facility] Emergency Department    [address] [address]    Phone:  [phone]    Fax:  [phone]                                       Sunny Samaniego   MRN: [id number]    Department:  [facility] Emergency Department   Date of Visit:  8/30/2017           After Visit Summary Signature Page     I have received my discharge instructions, and my questions have been answered. I have discussed any challenges I see with this plan with the nurse or doctor.    ..........................................................................................................................................  Patient/Patient Representative Signature      ..........................................................................................................................................  Patient Representative Print Name and Relationship to Patient    ..................................................               ................................................  Date                                            Time    ..........................................................................................................................................  Reviewed by Signature/Title    ...................................................              ..............................................  Date                                                            Time

## 2017-08-31 ASSESSMENT — ENCOUNTER SYMPTOMS
LIGHT-HEADEDNESS: 0
FEVER: 0
HEADACHES: 0
EYE PAIN: 0
CHILLS: 0
APPETITE CHANGE: 0
DIZZINESS: 0
NECK PAIN: 0
SHORTNESS OF BREATH: 0
NUMBNESS: 1
EYE REDNESS: 1
ABDOMINAL PAIN: 0
BACK PAIN: 0
EYE DISCHARGE: 0
PHOTOPHOBIA: 0

## 2017-09-04 ENCOUNTER — ANESTHESIA EVENT (OUTPATIENT)
Dept: SURGERY | Facility: CLINIC | Age: 52
End: 2017-09-04
Payer: COMMERCIAL

## 2017-09-04 ASSESSMENT — ENCOUNTER SYMPTOMS: DYSRHYTHMIAS: 0

## 2017-09-04 NOTE — ANESTHESIA PREPROCEDURE EVALUATION
Anesthesia Evaluation     .             ROS/MED HX    ENT/Pulmonary:  - neg pulmonary ROS     Neurologic:  - neg neurologic ROS     Cardiovascular:     (+) hypertension----. : . . . :. . Previous cardiac testing date:results:date: results:ECG reviewed date: results:Sinus tachycardia date: results:         (-) taking anticoagulants/antiplatelets, CHF and arrhythmias   METS/Exercise Tolerance:     Hematologic:         Musculoskeletal: Comment: Acromoclavicular arthritis on norco intermittently  (+) arthritis, , , -       GI/Hepatic:     (+) GERD Asymptomatic on medication, Other GI/Hepatic Previous upper GI bleed 12/23/2016 2/2 gastric ulcers      Renal/Genitourinary:  - ROS Renal section negative       Endo:  - neg endo ROS       Psychiatric:     (+) psychiatric history anxiety (Valium PRN at night for anxiety/insomnia )      Infectious Disease:   (+) Other Infectious Disease Chronic prostatitis currently on ciprofloxacin      Malignancy:         Other:    (+) No chance of pregnancy H/O Chronic Pain,H/O chronic opiod use , no other significant disability                                   Anesthesia Plan      History & Physical Review      ASA Status:  2 .        Plan for General and ETT with Intravenous induction. Maintenance will be Balanced.    PONV prophylaxis:  Ondansetron (or other 5HT-3) and Dexamethasone or Solumedrol  Additional equipment: 2nd IV ANESTHESIA PREOP EVALUATION    PROCEDURE: Open reduction left eye blow out fracture with hardware placement, need for intra-op O arm    SUMMARY: Sunny Samaniego is a 52 year old male with HTN, GERD, chronic prostatitis on cipro and acromioclavicular arthritis on norco who presents for the above procedure for a left orbital wall fracture.     ASSESSMENT & PLAN:  - ASA 2  - GETA with standard ASA monitors, IV induction, and balanced anesthetic  - PIV   - Antibiotics per surgery  - PONV prophylaxis  - Blood products available, possible administration discussed with  patient  - Relevant risks, benefits, alternatives and the anesthetic plan was discussed.  All questions were answered and there was agreement to proceed.          Postoperative Care  Postoperative pain management:  Multi-modal analgesia.      Consents  Anesthetic plan, risks, benefits and alternatives discussed with:  Patient..                          .

## 2017-09-05 ENCOUNTER — SURGERY (OUTPATIENT)
Age: 52
End: 2017-09-05

## 2017-09-05 ENCOUNTER — HOSPITAL ENCOUNTER (OUTPATIENT)
Facility: CLINIC | Age: 52
Discharge: HOME | End: 2017-09-05
Attending: OTOLARYNGOLOGY | Admitting: OTOLARYNGOLOGY
Payer: COMMERCIAL

## 2017-09-05 ENCOUNTER — ANESTHESIA (OUTPATIENT)
Dept: SURGERY | Facility: CLINIC | Age: 52
End: 2017-09-05
Payer: COMMERCIAL

## 2017-09-05 VITALS
BODY MASS INDEX: 26.68 KG/M2 | RESPIRATION RATE: 16 BRPM | HEIGHT: 69 IN | SYSTOLIC BLOOD PRESSURE: 140 MMHG | WEIGHT: 180.12 LBS | TEMPERATURE: 97.6 F | DIASTOLIC BLOOD PRESSURE: 94 MMHG | OXYGEN SATURATION: 98 %

## 2017-09-05 DIAGNOSIS — M19.019 AC (ACROMIOCLAVICULAR) JOINT ARTHRITIS: ICD-10-CM

## 2017-09-05 DIAGNOSIS — Z79.891 LONG-TERM CURRENT USE OF OPIATE ANALGESIC: Chronic | ICD-10-CM

## 2017-09-05 DIAGNOSIS — S02.32XA: Primary | ICD-10-CM

## 2017-09-05 LAB — POTASSIUM SERPL-SCNC: 4 MMOL/L (ref 3.4–5.3)

## 2017-09-05 PROCEDURE — C1713 ANCHOR/SCREW BN/BN,TIS/BN: HCPCS | Performed by: OTOLARYNGOLOGY

## 2017-09-05 PROCEDURE — 36000064 ZZH SURGERY LEVEL 4 EA 15 ADDTL MIN - UMMC: Performed by: OTOLARYNGOLOGY

## 2017-09-05 PROCEDURE — 25000128 H RX IP 250 OP 636: Performed by: ANESTHESIOLOGY

## 2017-09-05 PROCEDURE — 71000015 ZZH RECOVERY PHASE 1 LEVEL 2 EA ADDTL HR: Performed by: OTOLARYNGOLOGY

## 2017-09-05 PROCEDURE — 36415 COLL VENOUS BLD VENIPUNCTURE: CPT | Performed by: ANESTHESIOLOGY

## 2017-09-05 PROCEDURE — 71000027 ZZH RECOVERY PHASE 2 EACH 15 MINS: Performed by: OTOLARYNGOLOGY

## 2017-09-05 PROCEDURE — 25000128 H RX IP 250 OP 636: Performed by: NURSE ANESTHETIST, CERTIFIED REGISTERED

## 2017-09-05 PROCEDURE — 25000125 ZZHC RX 250

## 2017-09-05 PROCEDURE — 36000066 ZZH SURGERY LEVEL 4 W FLUORO 1ST 30 MIN - UMMC: Performed by: OTOLARYNGOLOGY

## 2017-09-05 PROCEDURE — 25000128 H RX IP 250 OP 636

## 2017-09-05 PROCEDURE — 40000170 ZZH STATISTIC PRE-PROCEDURE ASSESSMENT II: Performed by: OTOLARYNGOLOGY

## 2017-09-05 PROCEDURE — 84132 ASSAY OF SERUM POTASSIUM: CPT | Performed by: ANESTHESIOLOGY

## 2017-09-05 PROCEDURE — 37000009 ZZH ANESTHESIA TECHNICAL FEE, EACH ADDTL 15 MIN: Performed by: OTOLARYNGOLOGY

## 2017-09-05 PROCEDURE — 37000008 ZZH ANESTHESIA TECHNICAL FEE, 1ST 30 MIN: Performed by: OTOLARYNGOLOGY

## 2017-09-05 PROCEDURE — C9399 UNCLASSIFIED DRUGS OR BIOLOG: HCPCS | Performed by: NURSE ANESTHETIST, CERTIFIED REGISTERED

## 2017-09-05 PROCEDURE — 71000014 ZZH RECOVERY PHASE 1 LEVEL 2 FIRST HR: Performed by: OTOLARYNGOLOGY

## 2017-09-05 PROCEDURE — 27210794 ZZH OR GENERAL SUPPLY STERILE: Performed by: OTOLARYNGOLOGY

## 2017-09-05 PROCEDURE — 25000125 ZZHC RX 250: Performed by: OTOLARYNGOLOGY

## 2017-09-05 PROCEDURE — 25000565 ZZH ISOFLURANE, EA 15 MIN: Performed by: OTOLARYNGOLOGY

## 2017-09-05 DEVICE — IMP PLATE MESH SYN CONTOUR 1.5X100X100MM MAL TI 446.017: Type: IMPLANTABLE DEVICE | Site: FACE | Status: FUNCTIONAL

## 2017-09-05 DEVICE — IMPLANTABLE DEVICE: Type: IMPLANTABLE DEVICE | Site: FACE | Status: FUNCTIONAL

## 2017-09-05 RX ORDER — HYDROMORPHONE HYDROCHLORIDE 1 MG/ML
.3-.5 INJECTION, SOLUTION INTRAMUSCULAR; INTRAVENOUS; SUBCUTANEOUS EVERY 10 MIN PRN
Status: DISCONTINUED | OUTPATIENT
Start: 2017-09-05 | End: 2017-09-05 | Stop reason: HOSPADM

## 2017-09-05 RX ORDER — LABETALOL HYDROCHLORIDE 5 MG/ML
5-10 INJECTION, SOLUTION INTRAVENOUS EVERY 10 MIN PRN
Status: DISCONTINUED | OUTPATIENT
Start: 2017-09-05 | End: 2017-09-05 | Stop reason: HOSPADM

## 2017-09-05 RX ORDER — SODIUM CHLORIDE, SODIUM LACTATE, POTASSIUM CHLORIDE, CALCIUM CHLORIDE 600; 310; 30; 20 MG/100ML; MG/100ML; MG/100ML; MG/100ML
INJECTION, SOLUTION INTRAVENOUS CONTINUOUS
Status: DISCONTINUED | OUTPATIENT
Start: 2017-09-05 | End: 2017-09-05 | Stop reason: HOSPADM

## 2017-09-05 RX ORDER — LABETALOL HYDROCHLORIDE 5 MG/ML
10 INJECTION, SOLUTION INTRAVENOUS
Status: DISCONTINUED | OUTPATIENT
Start: 2017-09-05 | End: 2017-09-05 | Stop reason: HOSPADM

## 2017-09-05 RX ORDER — ONDANSETRON 2 MG/ML
INJECTION INTRAMUSCULAR; INTRAVENOUS PRN
Status: DISCONTINUED | OUTPATIENT
Start: 2017-09-05 | End: 2017-09-05

## 2017-09-05 RX ORDER — FENTANYL CITRATE 50 UG/ML
25-50 INJECTION, SOLUTION INTRAMUSCULAR; INTRAVENOUS
Status: DISCONTINUED | OUTPATIENT
Start: 2017-09-05 | End: 2017-09-05 | Stop reason: HOSPADM

## 2017-09-05 RX ORDER — HYDROCODONE BITARTRATE AND ACETAMINOPHEN 7.5; 325 MG/1; MG/1
1-2 TABLET ORAL EVERY 4 HOURS PRN
Qty: 30 TABLET | Refills: 0 | Status: SHIPPED | OUTPATIENT
Start: 2017-09-05 | End: 2017-09-14

## 2017-09-05 RX ORDER — HYDROMORPHONE HYDROCHLORIDE 1 MG/ML
.3-.5 INJECTION, SOLUTION INTRAMUSCULAR; INTRAVENOUS; SUBCUTANEOUS EVERY 5 MIN PRN
Status: DISCONTINUED | OUTPATIENT
Start: 2017-09-05 | End: 2017-09-05 | Stop reason: HOSPADM

## 2017-09-05 RX ORDER — LIDOCAINE 40 MG/G
CREAM TOPICAL
Status: DISCONTINUED | OUTPATIENT
Start: 2017-09-05 | End: 2017-09-05 | Stop reason: HOSPADM

## 2017-09-05 RX ORDER — ONDANSETRON 2 MG/ML
4 INJECTION INTRAMUSCULAR; INTRAVENOUS EVERY 30 MIN PRN
Status: DISCONTINUED | OUTPATIENT
Start: 2017-09-05 | End: 2017-09-05 | Stop reason: HOSPADM

## 2017-09-05 RX ORDER — PROPOFOL 10 MG/ML
INJECTION, EMULSION INTRAVENOUS PRN
Status: DISCONTINUED | OUTPATIENT
Start: 2017-09-05 | End: 2017-09-05

## 2017-09-05 RX ORDER — CEFAZOLIN SODIUM 1 G/3ML
INJECTION, POWDER, FOR SOLUTION INTRAMUSCULAR; INTRAVENOUS PRN
Status: DISCONTINUED | OUTPATIENT
Start: 2017-09-05 | End: 2017-09-05

## 2017-09-05 RX ORDER — LIDOCAINE HYDROCHLORIDE 20 MG/ML
INJECTION, SOLUTION INFILTRATION; PERINEURAL PRN
Status: DISCONTINUED | OUTPATIENT
Start: 2017-09-05 | End: 2017-09-05

## 2017-09-05 RX ORDER — METOPROLOL TARTRATE 1 MG/ML
INJECTION, SOLUTION INTRAVENOUS PRN
Status: DISCONTINUED | OUTPATIENT
Start: 2017-09-05 | End: 2017-09-05

## 2017-09-05 RX ORDER — LIDOCAINE HYDROCHLORIDE AND EPINEPHRINE 10; 10 MG/ML; UG/ML
INJECTION, SOLUTION INFILTRATION; PERINEURAL PRN
Status: DISCONTINUED | OUTPATIENT
Start: 2017-09-05 | End: 2017-09-05 | Stop reason: HOSPADM

## 2017-09-05 RX ORDER — DEXAMETHASONE SODIUM PHOSPHATE 4 MG/ML
INJECTION, SOLUTION INTRA-ARTICULAR; INTRALESIONAL; INTRAMUSCULAR; INTRAVENOUS; SOFT TISSUE PRN
Status: DISCONTINUED | OUTPATIENT
Start: 2017-09-05 | End: 2017-09-05

## 2017-09-05 RX ORDER — ONDANSETRON 4 MG/1
4 TABLET, ORALLY DISINTEGRATING ORAL EVERY 30 MIN PRN
Status: DISCONTINUED | OUTPATIENT
Start: 2017-09-05 | End: 2017-09-05 | Stop reason: HOSPADM

## 2017-09-05 RX ORDER — NALOXONE HYDROCHLORIDE 0.4 MG/ML
.1-.4 INJECTION, SOLUTION INTRAMUSCULAR; INTRAVENOUS; SUBCUTANEOUS
Status: DISCONTINUED | OUTPATIENT
Start: 2017-09-05 | End: 2017-09-05 | Stop reason: HOSPADM

## 2017-09-05 RX ORDER — SODIUM CHLORIDE, SODIUM LACTATE, POTASSIUM CHLORIDE, CALCIUM CHLORIDE 600; 310; 30; 20 MG/100ML; MG/100ML; MG/100ML; MG/100ML
INJECTION, SOLUTION INTRAVENOUS CONTINUOUS PRN
Status: DISCONTINUED | OUTPATIENT
Start: 2017-09-05 | End: 2017-09-05

## 2017-09-05 RX ORDER — FENTANYL CITRATE 50 UG/ML
INJECTION, SOLUTION INTRAMUSCULAR; INTRAVENOUS PRN
Status: DISCONTINUED | OUTPATIENT
Start: 2017-09-05 | End: 2017-09-05

## 2017-09-05 RX ORDER — CHLORHEXIDINE GLUCONATE ORAL RINSE 1.2 MG/ML
15 SOLUTION DENTAL 2 TIMES DAILY
Qty: 473 ML | Refills: 0 | Status: SHIPPED | OUTPATIENT
Start: 2017-09-05 | End: 2017-09-21

## 2017-09-05 RX ORDER — MEPERIDINE HYDROCHLORIDE 25 MG/ML
12.5 INJECTION INTRAMUSCULAR; INTRAVENOUS; SUBCUTANEOUS
Status: DISCONTINUED | OUTPATIENT
Start: 2017-09-05 | End: 2017-09-05 | Stop reason: HOSPADM

## 2017-09-05 RX ADMIN — LIDOCAINE HYDROCHLORIDE AND EPINEPHRINE 5 ML: 10; 10 INJECTION, SOLUTION INFILTRATION; PERINEURAL at 15:29

## 2017-09-05 RX ADMIN — ROCURONIUM BROMIDE 20 MG: 10 INJECTION INTRAVENOUS at 15:05

## 2017-09-05 RX ADMIN — PROCHLORPERAZINE EDISYLATE 5 MG: 5 INJECTION INTRAMUSCULAR; INTRAVENOUS at 16:30

## 2017-09-05 RX ADMIN — SODIUM CHLORIDE, POTASSIUM CHLORIDE, SODIUM LACTATE AND CALCIUM CHLORIDE: 600; 310; 30; 20 INJECTION, SOLUTION INTRAVENOUS at 13:32

## 2017-09-05 RX ADMIN — ROCURONIUM BROMIDE 10 MG: 10 INJECTION INTRAVENOUS at 14:11

## 2017-09-05 RX ADMIN — ROCURONIUM BROMIDE 10 MG: 10 INJECTION INTRAVENOUS at 15:23

## 2017-09-05 RX ADMIN — METOPROLOL TARTRATE 2 MG: 5 INJECTION INTRAVENOUS at 14:12

## 2017-09-05 RX ADMIN — ROCURONIUM BROMIDE 40 MG: 10 INJECTION INTRAVENOUS at 13:42

## 2017-09-05 RX ADMIN — PROPOFOL 30 MG: 10 INJECTION, EMULSION INTRAVENOUS at 15:23

## 2017-09-05 RX ADMIN — PROPOFOL 170 MG: 10 INJECTION, EMULSION INTRAVENOUS at 13:42

## 2017-09-05 RX ADMIN — CEFAZOLIN 2 G: 1 INJECTION, POWDER, FOR SOLUTION INTRAMUSCULAR; INTRAVENOUS at 14:09

## 2017-09-05 RX ADMIN — ONDANSETRON 4 MG: 2 INJECTION INTRAMUSCULAR; INTRAVENOUS at 15:30

## 2017-09-05 RX ADMIN — HYDROMORPHONE HYDROCHLORIDE 1 MG: 1 INJECTION, SOLUTION INTRAMUSCULAR; INTRAVENOUS; SUBCUTANEOUS at 15:26

## 2017-09-05 RX ADMIN — FENTANYL CITRATE 100 MCG: 50 INJECTION, SOLUTION INTRAMUSCULAR; INTRAVENOUS at 13:42

## 2017-09-05 RX ADMIN — DEXAMETHASONE SODIUM PHOSPHATE 8 MG: 4 INJECTION, SOLUTION INTRA-ARTICULAR; INTRALESIONAL; INTRAMUSCULAR; INTRAVENOUS; SOFT TISSUE at 14:16

## 2017-09-05 RX ADMIN — FENTANYL CITRATE 50 MCG: 50 INJECTION, SOLUTION INTRAMUSCULAR; INTRAVENOUS at 16:16

## 2017-09-05 RX ADMIN — LIDOCAINE HYDROCHLORIDE 100 MG: 20 INJECTION, SOLUTION INFILTRATION; PERINEURAL at 13:42

## 2017-09-05 RX ADMIN — ONDANSETRON 4 MG: 2 INJECTION INTRAMUSCULAR; INTRAVENOUS at 16:10

## 2017-09-05 RX ADMIN — SUGAMMADEX 170 MG: 100 INJECTION, SOLUTION INTRAVENOUS at 15:32

## 2017-09-05 RX ADMIN — MIDAZOLAM HYDROCHLORIDE 2 MG: 1 INJECTION, SOLUTION INTRAMUSCULAR; INTRAVENOUS at 13:31

## 2017-09-05 NOTE — ANESTHESIA CARE TRANSFER NOTE
Patient: Sunny Samaniego    Procedure(s):  Open Reduction Left Eye Blow Out Fracture with Hardware Placement - Wound Class: II-Clean Contaminated    Diagnosis: Left Eye Blow out Fracture   Diagnosis Additional Information: No value filed.    Anesthesia Type:   General, ETT     Note:  Airway :Face Mask  Patient transferred to:PACU        Vitals: (Last set prior to Anesthesia Care Transfer)    CRNA VITALS  9/5/2017 1511 - 9/5/2017 1551      9/5/2017             Resp Rate (observed): 16    EKG: Sinus rhythm                Electronically Signed By: RAYMUNDO Lara CRNA  September 5, 2017  3:51 PM

## 2017-09-05 NOTE — DISCHARGE INSTRUCTIONS
[facility]  Same-Day Surgery   Adult Discharge Orders & Instructions     For 24 hours after surgery    1. Get plenty of rest.  A responsible adult must stay with you for at least 24 hours after you leave the hospital.   2. Do not drive or use heavy equipment.  If you have weakness or tingling, don't drive or use heavy equipment until this feeling goes away.  3. Do not drink alcohol.  4. Avoid strenuous or risky activities.  Ask for help when climbing stairs.   5. You may feel lightheaded.  IF so, sit for a few minutes before standing.  Have someone help you get up.   6. If you have nausea (feel sick to your stomach): Drink only clear liquids such as apple juice, ginger ale, broth or 7-Up.  Rest may also help.  Be sure to drink enough fluids.  Move to a regular diet as you feel able.  7. You may have a slight fever. Call the doctor if your fever is over 100 F (37.7 C) (taken under the tongue) or lasts longer than 24 hours.  8. You may have a dry mouth, a sore throat, muscle aches or trouble sleeping.  These should go away after 24 hours.  9. Do not make important or legal decisions.   Call your doctor for any of the followin.  Signs of infection (fever, growing tenderness at the surgery site, a large amount of drainage or bleeding, severe pain, foul-smelling drainage, redness, swelling).    2. It has been over 8 to 10 hours since surgery and you are still not able to urinate (pass water).    3.  Headache for over 24 hours.    4.  Numbness, tingling or weakness the day after surgery (if you had spinal anesthesia).  To contact a doctor, call ______Dr Aguiar's office at [phone]__________________ or:        [phone] and ask for the resident on call for   ___________________________ENT___________________ (answered 24 hours a day)      Emergency Department:    [facility]: [phone]       (TTY for hearing impaired: [phone])    [facility]:  "[phone]       (TTY for hearing impaired: [phone])        1. Medications:  Resume your home medications. Take pain medications when needed as indicated. Use stool softeners to avoid constipation.    2. Wound care:  - The incision is behind your left upper lip. Use Peridex mouth wash to keep your mouth clean.  - Use ice pack to your left eye to decrease swelling    3. You may resume regular diet, avoid sharp food (e.g. potato chips) that my scratch the oral incision     4. For the next week, no heavy lifting more than 15 pounds, no strenuous activities.    5. No nose blowing. Cough/Sneeze with your mouth open.    5. Please call MD or come to the ED for shortness of breath, trouble breathing, inability to tolerate liquids, or signs of infection such as fevers or purulent drainage.    Call the [phone] clinic number if you have any questions and concerns during the date and call [phone] at night and ask for \"ENT resident on call\".    6. Follow up with Dr. Aguiar as in 2-3 weeks    "

## 2017-09-05 NOTE — OR NURSING
Pts main c/o has been nausia... Compazine.. semmed to be most effective.. Sleepy.. Able to take po.. No ooze noted from incision behind upper lip.. Ready for phase 2

## 2017-09-05 NOTE — IP AVS SNAPSHOT
Same Day Surgery [address] [address]    Phone:  [phone]                                       After Visit Summary   9/5/2017    Sunny Samaniego    MRN: [id number]           After Visit Summary Signature Page     I have received my discharge instructions, and my questions have been answered. I have discussed any challenges I see with this plan with the nurse or doctor.    ..........................................................................................................................................  Patient/Patient Representative Signature      ..........................................................................................................................................  Patient Representative Print Name and Relationship to Patient    ..................................................               ................................................  Date                                            Time    ..........................................................................................................................................  Reviewed by Signature/Title    ...................................................              ..............................................  Date                                                            Time

## 2017-09-05 NOTE — BRIEF OP NOTE
[facility], [place]    Brief Operative Note    Pre-operative diagnosis: Left Eye Blow out Fracture   Post-operative diagnosis Left Eye Blow out Fracture   Procedure: Procedure(s):  Open Reduction Left Eye Blow Out Fracture with Hardware Placement - Wound Class: II-Clean Contaminated  Surgeon: Surgeon(s) and Role:     * Chirag Aguiar MD - Primary  Anesthesia: General   Estimated blood loss: 5 cc  Drains: None  Specimens: * No specimens in log *  Findings:   Left orbital floor blowout fracture with fat herniation into maxillary sinus  Complications: None.  Implants: 1 x 1.5 mm mesh plate. 1 x 2.0 mm curved orbital plate (9 holes initially, cut off 2 holes)

## 2017-09-05 NOTE — LETTER
To whom it may concern,    Mr. Sunny Samaniego had injury of his left eye and underwent surgery on 9/5/2017. Please excuse him from work till 9/10/2017 for recovery.    Sincerely,        Popeye Adler MD  09/05/2017

## 2017-09-05 NOTE — ANESTHESIA POSTPROCEDURE EVALUATION
Patient: Sunny Samaniego    Procedure(s):  Open Reduction Left Eye Blow Out Fracture with Hardware Placement - Wound Class: II-Clean Contaminated    Diagnosis:Left Eye Blow out Fracture   Diagnosis Additional Information: No value filed.    Anesthesia Type:  General, ETT    Note:  Anesthesia Post Evaluation    Patient location during evaluation: PACU  Patient participation: Able to fully participate in evaluation  Level of consciousness: awake and alert  Pain management: adequate  Airway patency: patent  Cardiovascular status: acceptable and hypertensive (BP at baseline)  Respiratory status: acceptable  Hydration status: acceptable  PONV: none     Anesthetic complications: None          Last vitals:  Vitals:    09/05/17 1053 09/05/17 1550 09/05/17 1600   BP: (!) 144/99 (!) 148/94    Resp: 16 16    Temp: 36.4  C (97.5  F) 36.6  C (97.9  F)    SpO2: 99% 100% 100%         Electronically Signed By: Lizbet Quinn MD  September 5, 2017  4:08 PM

## 2017-09-05 NOTE — IP AVS SNAPSHOT
MRN:[id number]                      After Visit Summary   9/5/2017    Sunny Samaniego    MRN: [id number]           Thank you!     Thank you for choosing [facility] for your care. Our goal is always to provide you with excellent care. Hearing back from our patients is one way we can continue to improve our services. Please take a few minutes to complete the written survey that you may receive in the mail after you visit with us. Thank you!        Patient Information     Date Of Birth          1965        About your hospital stay     You were admitted on:  September 5, 2017 You last received care in the:  Same Day Surgery [facility]    You were discharged on:  September 5, 2017       Who to Call     For medical emergencies, please call 911.  For non-urgent questions about your medical care, please call your primary care provider or clinic, [phone]  For questions related to your surgery, please call your surgery clinic        Attending Provider     Provider Chirag Bonilla MD Otolaryngology       Primary Care Provider Office Phone # Fax #    Talia Bhandari [phone]      After Care Instructions     Diet Instructions       Resume pre procedure diet            Discharge Instructions       Patient to follow up with Dr. Aguiar in ENT clinic in 2-3 weeks            No blowing nose                 Further instructions from your care team       [facility]  Same-Day Surgery   Adult Discharge Orders & Instructions     For 24 hours after surgery    1. Get plenty of rest.  A responsible adult must stay with you for at least 24 hours after you leave the hospital.   2. Do not drive or use heavy equipment.  If you have weakness or tingling, don't drive or use heavy equipment until this feeling goes away.  3. Do not drink alcohol.  4. Avoid strenuous or risky activities.  Ask for help when climbing stairs.   5. You may feel  lightheaded.  IF so, sit for a few minutes before standing.  Have someone help you get up.   6. If you have nausea (feel sick to your stomach): Drink only clear liquids such as apple juice, ginger ale, broth or 7-Up.  Rest may also help.  Be sure to drink enough fluids.  Move to a regular diet as you feel able.  7. You may have a slight fever. Call the doctor if your fever is over 100 F (37.7 C) (taken under the tongue) or lasts longer than 24 hours.  8. You may have a dry mouth, a sore throat, muscle aches or trouble sleeping.  These should go away after 24 hours.  9. Do not make important or legal decisions.   Call your doctor for any of the followin.  Signs of infection (fever, growing tenderness at the surgery site, a large amount of drainage or bleeding, severe pain, foul-smelling drainage, redness, swelling).    2. It has been over 8 to 10 hours since surgery and you are still not able to urinate (pass water).    3.  Headache for over 24 hours.    4.  Numbness, tingling or weakness the day after surgery (if you had spinal anesthesia).  To contact a doctor, call ______Dr Aguiar's office at [phone]__________________ or:        [phone] and ask for the resident on call for   ___________________________ENT___________________ (answered 24 hours a day)      Emergency Department:    [facility]: [phone]       (TTY for hearing impaired: [phone])    [facility]: [phone]       (TTY for hearing impaired: [phone])        1. Medications:  Resume your home medications. Take pain medications when needed as indicated. Use stool softeners to avoid constipation.    2. Wound care:  - The incision is behind your left upper lip. Use Peridex mouth wash to keep your mouth clean.  - Use ice pack to your left eye to decrease swelling    3. You may resume regular diet, avoid sharp food (e.g. potato chips) that my scratch the oral incision     4. For the next week, no heavy lifting more  "than 15 pounds, no strenuous activities.    5. No nose blowing. Cough/Sneeze with your mouth open.    5. Please call MD or come to the ED for shortness of breath, trouble breathing, inability to tolerate liquids, or signs of infection such as fevers or purulent drainage.    Call the [phone] clinic number if you have any questions and concerns during the date and call [phone] at night and ask for \"ENT resident on call\".    6. Follow up with Dr. Aguiar as in 2-3 weeks      Pending Results     No orders found from 9/3/2017 to 9/6/2017.            Admission Information     Date & Time Provider Department Dept. Phone    9/5/2017 Chirag Aguiar MD Same Day Surgery [facility] [phone]      Your Vitals Were     Blood Pressure Temperature Respirations Height Weight Pulse Oximetry    137/85 98  F (36.7  C) (Oral) 14 1.75 m (5' 8.9\") 81.7 kg (180 lb 1.9 oz) 96%    BMI (Body Mass Index)                   26.68 kg/m2           [facility] Information     [employer] gives you secure access to your electronic health record. If you see a primary care provider, you can also send messages to your care team and make appointments. If you have questions, please call your primary care clinic.  If you do not have a primary care provider, please call [phone] and they will assist you.        Care EveryWhere ID     This is your Care EveryWhere ID. This could be used by other organizations to access your [place] medical records  [id number]        Equal Access to Services     MARY MORSE : Hadii carmen reed Sohector, waaxda luqadaha, qaybta kaalmashannon lewis. So [facility] [phone].    ATENCIÓN: Si habla español, tiene a lozada disposición servicios gratuitos de asistencia lingüística. Llame al [phone].    We comply with applicable federal civil rights laws and [place] laws. We do not discriminate on the basis of race, color, national origin, age, disability sex, " sexual orientation or gender identity.               Review of your medicines      START taking        Dose / Directions    chlorhexidine 0.12 % solution   Commonly known as:  PERIDEX   Used for:  Fracture of left orbital floor, initial encounter (H)        Dose:  15 mL   Swish and spit 15 mLs in mouth 2 times daily   Quantity:  473 mL   Refills:  0         CONTINUE these medicines which may have CHANGED, or have new prescriptions. If we are uncertain of the size of tablets/capsules you have at home, strength may be listed as something that might have changed.        Dose / Directions    HYDROcodone-acetaminophen 7.5-325 MG per tablet   Commonly known as:  NORCO   This may have changed:    - how much to take  - when to take this   Used for:  AC (acromioclavicular) joint arthritis, Long-term current use of opiate analgesic        Dose:  1-2 tablet   Take 1-2 tablets by mouth every 4 hours as needed for moderate to severe pain   Quantity:  30 tablet   Refills:  0         CONTINUE these medicines which have NOT CHANGED        Dose / Directions    amoxicillin-clavulanate 875-125 MG per tablet   Commonly known as:  AUGMENTIN        Dose:  1 tablet   Take 1 tablet by mouth 2 times daily for 10 days   Quantity:  19 tablet   Refills:  0       atenolol 25 MG tablet   Commonly known as:  TENORMIN   Used for:  Benign essential hypertension, Allergic rhinitis due to pollen, unspecified rhinitis seasonality        Dose:  25 mg   Take 1 tablet (25 mg) by mouth daily   Quantity:  90 tablet   Refills:  3       cetirizine 10 MG tablet   Commonly known as:  zyrTEC   Used for:  Allergic rhinitis due to pollen, unspecified rhinitis seasonality        Dose:  10 mg   Take 1 tablet (10 mg) by mouth every evening   Quantity:  30 tablet   Refills:  1       ciprofloxacin 500 MG tablet   Commonly known as:  CIPRO   Used for:  Chronic prostatitis        Dose:  500 mg   Take 1 tablet (500 mg) by mouth 2 times daily   Quantity:  42 tablet    Refills:  0       fluticasone 50 MCG/ACT spray   Commonly known as:  FLONASE   Used for:  Allergic rhinitis due to pollen, unspecified rhinitis seasonality        Dose:  1-2 spray   Spray 1-2 sprays into both nostrils daily   Quantity:  1 Bottle   Refills:  11       IBUPROFEN PO        Dose:  400 mg   Take 400 mg by mouth every 6 hours as needed for moderate pain   Refills:  0       losartan-hydrochlorothiazide 50-12.5 MG per tablet   Commonly known as:  HYZAAR   Used for:  Benign essential hypertension        Dose:  1 tablet   Take 1 tablet by mouth daily   Quantity:  90 tablet   Refills:  3       NEXIUM PO        Dose:  20 mg   Take 20 mg by mouth daily   Refills:  0            Where to get your medicines      These medications were sent to [facility] [place], [place] - [address] [address]     Phone:  [phone]     chlorhexidine 0.12 % solution         Some of these will need a paper prescription and others can be bought over the counter. Ask your nurse if you have questions.     Bring a paper prescription for each of these medications     HYDROcodone-acetaminophen 7.5-325 MG per tablet                Protect others around you: Learn how to safely use, store and throw away your medicines at www.disposemymeds.org.             Medication List: This is a list of all your medications and when to take them. Check marks below indicate your daily home schedule. Keep this list as a reference.      Medications           Morning Afternoon Evening Bedtime As Needed    amoxicillin-clavulanate 875-125 MG per tablet   Commonly known as:  AUGMENTIN   Take 1 tablet by mouth 2 times daily for 10 days                                atenolol 25 MG tablet   Commonly known as:  TENORMIN   Take 1 tablet (25 mg) by mouth daily                                cetirizine 10 MG tablet   Commonly known as:  zyrTEC   Take 1 tablet (10 mg) by mouth every evening                                 chlorhexidine 0.12 % solution   Commonly known as:  PERIDEX   Swish and spit 15 mLs in mouth 2 times daily                                ciprofloxacin 500 MG tablet   Commonly known as:  CIPRO   Take 1 tablet (500 mg) by mouth 2 times daily                                fluticasone 50 MCG/ACT spray   Commonly known as:  FLONASE   Spray 1-2 sprays into both nostrils daily                                HYDROcodone-acetaminophen 7.5-325 MG per tablet   Commonly known as:  NORCO   Take 1-2 tablets by mouth every 4 hours as needed for moderate to severe pain                                IBUPROFEN PO   Take 400 mg by mouth every 6 hours as needed for moderate pain                                losartan-hydrochlorothiazide 50-12.5 MG per tablet   Commonly known as:  HYZAAR   Take 1 tablet by mouth daily                                NEXIUM PO   Take 20 mg by mouth daily

## 2017-09-06 ENCOUNTER — HOSPITAL ENCOUNTER (EMERGENCY)
Facility: CLINIC | Age: 52
Discharge: HOME | End: 2017-09-06
Attending: FAMILY MEDICINE | Admitting: FAMILY MEDICINE
Payer: COMMERCIAL

## 2017-09-06 VITALS
DIASTOLIC BLOOD PRESSURE: 99 MMHG | OXYGEN SATURATION: 100 % | TEMPERATURE: 97.5 F | BODY MASS INDEX: 25.92 KG/M2 | WEIGHT: 175 LBS | RESPIRATION RATE: 20 BRPM | SYSTOLIC BLOOD PRESSURE: 136 MMHG | HEIGHT: 69 IN | HEART RATE: 77 BPM

## 2017-09-06 DIAGNOSIS — R22.0 SWELLING OF LEFT SIDE OF FACE: ICD-10-CM
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
## 2017-09-06 DIAGNOSIS — R22.0 FACIAL SWELLING: ICD-10-CM

## 2017-09-06 DIAGNOSIS — M96.89 POSTOPERATIVE SURGICAL COMPLICATION INVOLVING MUSCULOSKELETAL SYSTEM ASSOCIATED WITH MUSCULOSKELETAL PROCEDURE, UNSPECIFIED COMPLICATION: ICD-10-CM

## 2017-09-06 DIAGNOSIS — Z98.890 HISTORY OF FACIAL SURGERY: ICD-10-CM

## 2017-09-06 PROCEDURE — 99282 EMERGENCY DEPT VISIT SF MDM: CPT | Performed by: FAMILY MEDICINE

## 2017-09-06 PROCEDURE — 99284 EMERGENCY DEPT VISIT MOD MDM: CPT | Mod: Z6 | Performed by: FAMILY MEDICINE

## 2017-09-06 RX ORDER — OXYCODONE HYDROCHLORIDE 5 MG/1
TABLET ORAL
Qty: 20 TABLET | Refills: 0 | Status: SHIPPED | OUTPATIENT
Start: 2017-09-06 | End: 2017-11-15

## 2017-09-06 NOTE — ED NOTES
DC instructions reviewed with pt and spouse states understanding. Sent with printed RX. Pt ambulatory out of ER with spouse.

## 2017-09-06 NOTE — ED NOTES
"Pt had lt side facial surgery yesterday and today at three lt cheek began to swell and has bleeding out of the lt nostril.  Pt states he was \"using mouth when suddenly my lt cheek started to swell and lt side of nose started to bleed.\"  Pain also became more intense.  Very minimal bleeding from nose in ER.   "

## 2017-09-06 NOTE — DISCHARGE INSTRUCTIONS
Toño Lares as directed. Do not blow your nose. Do not sneeze. Oxycodone 1-2 tablets every 3 hours as needed for pain. Discontinue hydrocortisone. Follow-up tomorrow in surgery clinic. Return to the emergency room if further difficulties. Take milk of magnesia as directed if no bowel movement by tomorrow.

## 2017-09-06 NOTE — ED AVS SNAPSHOT
[facility] Emergency Department    [address] [address]    Phone:  [phone]    Fax:  [phone]                                       Sunny Samaniego   MRN: [id number]    Department:  [facility] Emergency Department   Date of Visit:  9/6/2017           After Visit Summary Signature Page     I have received my discharge instructions, and my questions have been answered. I have discussed any challenges I see with this plan with the nurse or doctor.    ..........................................................................................................................................  Patient/Patient Representative Signature      ..........................................................................................................................................  Patient Representative Print Name and Relationship to Patient    ..................................................               ................................................  Date                                            Time    ..........................................................................................................................................  Reviewed by Signature/Title    ...................................................              ..............................................  Date                                                            Time

## 2017-09-06 NOTE — OP NOTE
DATE OF SERVICE:  09/05/2017      PREOPERATIVE DIAGNOSIS:  Left orbital floor blowout fracture.      POSTOPERATIVE DIAGNOSIS:  Left orbital floor blowout fracture.      PROCEDURE PERFORMED:  Endoscopic transantral approach of open reduction internal fixation of left orbital floor blowout fracture.      SURGEON:  Chirag Aguiar MD      ASSISTANT SURGEON:  Popeye Adler MD      ANESTHESIA:  General.      ESTIMATED BLOOD LOSS:  5 mL.      INDICATIONS:  Mr. Sunny Samaniego is a 52-year-old male who was involved in an accident in which wood struck his left eye resulting in left orbital floor blowout fracture.  He has been evaluated by Ophthalmology Service and noted to have no globe injury or extraocular muscle entrapment.  However, due to the amount of fat herniation down to the maxillary sinus, he is in risk of enophthalmos.  Therefore, surgical intervention was recommended.  After a detailed discussion of risks, benefits and alternatives, he was elected to proceed with the above-mentioned procedure.      INTRAOPERATIVE FINDINGS:   1.  Left orbital floor blowout fracture with fat herniation into the maxillary sinus.   2.  Forced duction tests before and after the procedure show full range of motion in all directions.   3.  One of the 1.5 mm mesh plate was placed on the maxillary sinus site of the orbital floor.     4.  One of the 2 mm curved orbital plate (9 holes initially cut down to 2 holes) was placed in the anterior maxillary sinus wall.      DESCRIPTION OF PROCEDURE:  After properly identifying patient and obtaining signed informed consent, the patient was transferred to the operative room.  General anesthesia was induced and patient was intubated orotracheally by Anesthesia staff.  The patient was placed in a supine position and the table was turned to 90 degrees to facilitate the procedure.  The patient's face and mouth were prepped and draped in the usual sterile fashion.  A timeout was carried out immediately  prior to the procedure to confirm the identity of the patient and correctness of the procedures.  A total of 5 mL of 1% lidocaine with 1:100,000 epinephrine was injected to the patient's left upper gingival buccal sulcus.  The incision was made at the left gingival buccal sulcus with a #15 blade and then the dissection was carried down to the anterior face of the maxillary bone with Bovie cautery.  The dissection was then carried in the subperiosteal plane with a #9 elevator.  The dissection was carried superiorly to the level of the infraorbital nerve, medially to the nasal process of the maxillary bone and laterally to the zygoma.  The maxillary sinus antral window was then made with an osteotome and mallet.  The bone piece was saved for later use.  The 0-degree endoscope was then used to visualize the maxillary sinus.  The orbital floor fracture was identified and herniation of the orbital fat were noted.  The orbital fat was then reduced back to the orbit and held it in place.  A 1.5 mm mesh plate was then fashioned into the shape of the orbital floor fracture and secured with one 4 mm screw posteriorly.  The anterior portion of the mesh plate was bent to go around the anterior table of the maxillary sinus in order to lock the plate in place.  The previously saved bone piece was then attempted to place back to the maxillary antral window and attempted to be secured with a 2.0 mm curved orbital plate.  However, the bone was too fragile to be secured with a screw.  The 2.0 mm curved orbital plate was then secured onto the maxillary sinus anterior wall defect with two 4 mm screws to prevent soft tissue collapsing into the maxillary sinus.  The gingival buccal sulcus incision was then closed with 3-0 Vicryl in a running horizontal mattress fashion.  The patient's oral cavity was then irrigated with a copious amount of normal saline and suctioned out.  A forced duction test was then performed and confirmed that  patient had full range of motion in all directions.  This concludes the procedure.  The patient was then turned back to Anesthesia staff, awakened from general anesthesia without difficulties.        Dr. Tovar was present during the entire procedure.      SPECIMENS:  None.      COMPLICATIONS:  None.      IMPLANTS:   1.  One of the 1.5 mm mesh plate.   2.  One of the 2.0 mm curved orbital plate (9 holes initially, cut off 2 holes).         DIAN TOVAR MD       As dictated by DASHA PRATHER MD            D: 2017 16:04   T: 2017 18:50   MT:       Name:     NAE MARTINS   MRN:      [id number]        Account:        [id number]   :      1965           Procedure Date: 2017      Document: [id number]

## 2017-09-06 NOTE — ED AVS SNAPSHOT
[facility] Emergency Department    [address] [facility] [address]    Phone:  [phone]    Fax:  [phone]                                       Sunny Samaniego   MRN: [id number]    Department:  [facility] Emergency Department   Date of Visit:  9/6/2017           Patient Information     Date Of Birth          1965        Your diagnoses for this visit were:     History of facial surgery     Swelling of left side of face        You were seen by Hema, Moustapha RITCHIE MD.      Follow-up Information     Follow up with Chirag Aguiar MD In 1 day.    Specialty:  Otolaryngology    Contact information:    [address] [facility] [id number] [phone]          Follow up with [facility] Emergency Department.    Specialty:  EMERGENCY MEDICINE    Why:  If symptoms worsen    Contact information:    [address] [place] [address] [phone]    Additional information:    The medical center is located at   [address]. (between [address] in [place], [place] miles north   of [place]).        Discharge Instructions       Toño Afrin as directed. Do not blow your nose. Do not sneeze. Oxycodone 1-2 tablets every 3 hours as needed for pain. Discontinue hydrocortisone. Follow-up tomorrow in surgery clinic. Return to the emergency room if further difficulties. Take milk of magnesia as directed if no bowel movement by tomorrow.    24 Hour Appointment Hotline       To make an appointment at any [facility], call [phone] ([phone]). If you don't have a family doctor or clinic, we will help you find one. [facility] are conveniently located to serve the needs of you and your family.             Review of your medicines      START taking        Dose / Directions Last dose taken    oxyCODONE 5 MG IR tablet   Commonly known as:  ROXICODONE   Quantity:  20 tablet        Take 1-2 tablets every 3-6 hours as needed for severe pain only    Refills:  0          Our records show that you are taking the medicines listed below. If these are incorrect, please call your family doctor or clinic.        Dose / Directions Last dose taken    amoxicillin-clavulanate 875-125 MG per tablet   Commonly known as:  AUGMENTIN   Dose:  1 tablet   Quantity:  19 tablet        Take 1 tablet by mouth 2 times daily for 10 days   Refills:  0        atenolol 25 MG tablet   Commonly known as:  TENORMIN   Dose:  25 mg   Quantity:  90 tablet        Take 1 tablet (25 mg) by mouth daily   Refills:  3        cetirizine 10 MG tablet   Commonly known as:  zyrTEC   Dose:  10 mg   Quantity:  30 tablet        Take 1 tablet (10 mg) by mouth every evening   Refills:  1        chlorhexidine 0.12 % solution   Commonly known as:  PERIDEX   Dose:  15 mL   Quantity:  473 mL        Swish and spit 15 mLs in mouth 2 times daily   Refills:  0        ciprofloxacin 500 MG tablet   Commonly known as:  CIPRO   Dose:  500 mg   Quantity:  42 tablet        Take 1 tablet (500 mg) by mouth 2 times daily   Refills:  0        fluticasone 50 MCG/ACT spray   Commonly known as:  FLONASE   Dose:  1-2 spray   Quantity:  1 Bottle        Spray 1-2 sprays into both nostrils daily   Refills:  11        HYDROcodone-acetaminophen 7.5-325 MG per tablet   Commonly known as:  NORCO   Dose:  1-2 tablet   Quantity:  30 tablet        Take 1-2 tablets by mouth every 4 hours as needed for moderate to severe pain   Refills:  0        IBUPROFEN PO   Dose:  400 mg        Take 400 mg by mouth every 6 hours as needed for moderate pain   Refills:  0        losartan-hydrochlorothiazide 50-12.5 MG per tablet   Commonly known as:  HYZAAR   Dose:  1 tablet   Quantity:  90 tablet        Take 1 tablet by mouth daily   Refills:  3        NEXIUM PO   Dose:  20 mg        Take 20 mg by mouth daily   Refills:  0                Prescriptions were sent or printed at these locations (1 Prescription)                   Other Prescriptions                 Printed at Department/Unit printer (1 of 1)         oxyCODONE (ROXICODONE) 5 MG IR tablet                Orders Needing Specimen Collection     None      Pending Results     No orders found from 9/4/2017 to 9/7/2017.            Pending Culture Results     No orders found from 9/4/2017 to 9/7/2017.            Pending Results Instructions     If you had any lab results that were not finalized at the time of your Discharge, you can call the ED Lab Result RN at [phone]. You will be contacted by this team for any positive Lab results or changes in treatment. The nurses are available 7 days a week from 10A to 6:30P.  You can leave a message 24 hours per day and they will return your call.        Test Results From Your Hospital Stay               Thank you for choosing [facility]       Thank you for choosing [facility] for your care. Our goal is always to provide you with excellent care. Hearing back from our patients is one way we can continue to improve our services. Please take a few minutes to complete the written survey that you may receive in the mail after you visit with us. Thank you!        [employer] Information     [employer] gives you secure access to your electronic health record. If you see a primary care provider, you can also send messages to your care team and make appointments. If you have questions, please call your primary care clinic.  If you do not have a primary care provider, please call [phone] and they will assist you.        Care EveryWhere ID     This is your Care EveryWhere ID. This could be used by other organizations to access your [facility] medical records  [id number]        Equal Access to Services     MARY MORSE AH: Tona Cordon, waaxda luberlin, qaybta kaalmada shannon cherry. So [facility] [phone].    ATENCIÓN: Si habla español, tiene a lozada disposición servicios gratuitos de asistencia lingüística. Llame al [phone].    We  comply with applicable federal civil rights laws and [place] laws. We do not discriminate on the basis of race, color, national origin, age, disability sex, sexual orientation or gender identity.            After Visit Summary       This is your record. Keep this with you and show to your community pharmacist(s) and doctor(s) at your next visit.

## 2017-09-07 ENCOUNTER — CARE COORDINATION (OUTPATIENT)
Dept: OTOLARYNGOLOGY | Facility: CLINIC | Age: 52
End: 2017-09-07

## 2017-09-07 ENCOUNTER — HOSPITAL ENCOUNTER (EMERGENCY)
Facility: CLINIC | Age: 52
Discharge: HOME | End: 2017-09-07
Attending: EMERGENCY MEDICINE | Admitting: EMERGENCY MEDICINE
Payer: COMMERCIAL

## 2017-09-07 VITALS
RESPIRATION RATE: 18 BRPM | DIASTOLIC BLOOD PRESSURE: 118 MMHG | SYSTOLIC BLOOD PRESSURE: 167 MMHG | OXYGEN SATURATION: 98 % | TEMPERATURE: 97.7 F

## 2017-09-07 DIAGNOSIS — R60.9 EDEMA, UNSPECIFIED TYPE: ICD-10-CM

## 2017-09-07 DIAGNOSIS — Z98.890 STATUS POST SURGERY: ICD-10-CM

## 2017-09-07 PROCEDURE — 99284 EMERGENCY DEPT VISIT MOD MDM: CPT | Mod: Z6 | Performed by: EMERGENCY MEDICINE

## 2017-09-07 PROCEDURE — 99282 EMERGENCY DEPT VISIT SF MDM: CPT | Performed by: EMERGENCY MEDICINE

## 2017-09-07 RX ORDER — METHYLPREDNISOLONE 4 MG
TABLET, DOSE PACK ORAL
Qty: 21 TABLET | Refills: 0 | Status: SHIPPED | OUTPATIENT
Start: 2017-09-07 | End: 2017-09-21

## 2017-09-07 ASSESSMENT — ENCOUNTER SYMPTOMS
ABDOMINAL PAIN: 0
DIFFICULTY URINATING: 0
COLOR CHANGE: 0
SHORTNESS OF BREATH: 0
CHILLS: 0
CONFUSION: 0
TROUBLE SWALLOWING: 0
NECK STIFFNESS: 0
EYE REDNESS: 0
FEVER: 0
VOICE CHANGE: 0
FACIAL SWELLING: 1
SORE THROAT: 0
ARTHRALGIAS: 0
HEADACHES: 0

## 2017-09-07 NOTE — ED AVS SNAPSHOT
[facility], [place], Emergency Department    [address] [facility] [address]    Phone:  [phone]                                       Sunny Samaniego   MRN: [id number]    Department:  [facility], Emergency Department   Date of Visit:  9/7/2017           After Visit Summary Signature Page     I have received my discharge instructions, and my questions have been answered. I have discussed any challenges I see with this plan with the nurse or doctor.    ..........................................................................................................................................  Patient/Patient Representative Signature      ..........................................................................................................................................  Patient Representative Print Name and Relationship to Patient    ..................................................               ................................................  Date                                            Time    ..........................................................................................................................................  Reviewed by Signature/Title    ...................................................              ..............................................  Date                                                            Time

## 2017-09-07 NOTE — ED AVS SNAPSHOT
[facility], Emergency Department    [address] [facility] [address] [address]    Phone:  [phone]                                       Sunny Samaniego   MRN: [id number]    Department:  [facility], Emergency Department   Date of Visit:  9/7/2017           Patient Information     Date Of Birth          1965        Your diagnoses for this visit were:     Edema, unspecified type        You were seen by Nigel George MD.      Follow-up Information     Follow up with [facility], Emergency Department.    Specialty:  EMERGENCY MEDICINE    Why:  As needed    Contact information:    [address] [facility] [id number] [phone]    Additional information:    The [facility] is located on the corner of [address] and [address] on the [facility]. It is easily accessible from virtually any point in the [facility] area, via [address] and [employer].      24 Hour Appointment Hotline       To make an appointment at any [facility] clinic, call [phone]). If you don't have a family doctor or clinic, we will help you find one. [facility] clinics are conveniently located to serve the needs of you and your family.             Review of your medicines      START taking        Dose / Directions Last dose taken    methylPREDNISolone 4 MG tablet   Commonly known as:  MEDROL DOSEPAK   Quantity:  21 tablet        Follow package instructions   Refills:  0          Our records show that you are taking the medicines listed below. If these are incorrect, please call your family doctor or clinic.        Dose / Directions Last dose taken    amoxicillin-clavulanate 875-125 MG per tablet   Commonly known as:  AUGMENTIN   Dose:  1 tablet   Quantity:  19 tablet        Take 1 tablet by mouth 2 times daily for 10 days   Refills:  0        atenolol 25 MG tablet   Commonly known as:  TENORMIN   Dose:  25 mg   Quantity:  90 tablet        Take 1 tablet (25 mg) by  mouth daily   Refills:  3        cetirizine 10 MG tablet   Commonly known as:  zyrTEC   Dose:  10 mg   Quantity:  30 tablet        Take 1 tablet (10 mg) by mouth every evening   Refills:  1        chlorhexidine 0.12 % solution   Commonly known as:  PERIDEX   Dose:  15 mL   Quantity:  473 mL        Swish and spit 15 mLs in mouth 2 times daily   Refills:  0        ciprofloxacin 500 MG tablet   Commonly known as:  CIPRO   Dose:  500 mg   Quantity:  42 tablet        Take 1 tablet (500 mg) by mouth 2 times daily   Refills:  0        fluticasone 50 MCG/ACT spray   Commonly known as:  FLONASE   Dose:  1-2 spray   Quantity:  1 Bottle        Spray 1-2 sprays into both nostrils daily   Refills:  11        HYDROcodone-acetaminophen 7.5-325 MG per tablet   Commonly known as:  NORCO   Dose:  1-2 tablet   Quantity:  30 tablet        Take 1-2 tablets by mouth every 4 hours as needed for moderate to severe pain   Refills:  0        IBUPROFEN PO   Dose:  400 mg        Take 400 mg by mouth every 6 hours as needed for moderate pain   Refills:  0        losartan-hydrochlorothiazide 50-12.5 MG per tablet   Commonly known as:  HYZAAR   Dose:  1 tablet   Quantity:  90 tablet        Take 1 tablet by mouth daily   Refills:  3        NEXIUM PO   Dose:  20 mg        Take 20 mg by mouth daily   Refills:  0        oxyCODONE 5 MG IR tablet   Commonly known as:  ROXICODONE   Quantity:  20 tablet        Take 1-2 tablets every 3-6 hours as needed for severe pain only   Refills:  0                Prescriptions were sent or printed at these locations (1 Prescription)                   Other Prescriptions                Printed at Department/Unit printer (1 of 1)         methylPREDNISolone (MEDROL DOSEPAK) 4 MG tablet                Orders Needing Specimen Collection     None      Pending Results     No orders found from 9/5/2017 to 9/8/2017.            Pending Culture Results     No orders found from 9/5/2017 to 9/8/2017.            Pending Results  Instructions     If you had any lab results that were not finalized at the time of your Discharge, you can call the ED Lab Result RN at [phone]. You will be contacted by this team for any positive Lab results or changes in treatment. The nurses are available 7 days a week from 10A to 6:30P.  You can leave a message 24 hours per day and they will return your call.        Thank you for choosing [facility]       Thank you for choosing [facility] for your care. Our goal is always to provide you with excellent care. Hearing back from our patients is one way we can continue to improve our services. Please take a few minutes to complete the written survey that you may receive in the mail after you visit with us. Thank you!        [employer] Information     [employer] gives you secure access to your electronic health record. If you see a primary care provider, you can also send messages to your care team and make appointments. If you have questions, please call your primary care clinic.  If you do not have a primary care provider, please call [phone] and they will assist you.        Care EveryWhere ID     This is your Care EveryWhere ID. This could be used by other organizations to access your [facility] medical records  [id number]        Equal Access to Services     MARY MORSE : Hadneil Cordon, jorge ko, lisha cherry, shannon cassidy. So [facility] [phone].    ATENCIÓN: Si habla español, tiene a lozada disposición servicios gratuitos de asistencia lingüística. Llame al [phone].    We comply with applicable federal civil rights laws and [place] laws. We do not discriminate on the basis of race, color, national origin, age, disability sex, sexual orientation or gender identity.            After Visit Summary       This is your record. Keep this with you and show to your community pharmacist(s) and doctor(s) at your next visit.

## 2017-09-07 NOTE — LETTER
To whom it may concern,    Mr. Sunny Samaniego had injury of his left eye and underwent surgery on 9/5/2017. Please excuse him from work till 9/17/2017 for recovery.    Sincerely,        Popeye Adler MD  09/05/2017

## 2017-09-07 NOTE — ED PROVIDER NOTES
History     Chief Complaint   Patient presents with     Facial Swelling     HPI  Sunny Samaniego is a 52 year old male who is day 2 postoperative surgical repair of left maxillary fracture and blowout fracture of the left orbit. He had 2 small plates placed in the surgery. He developed worsened postoperative swelling so was seen at [facility] yesterday, at which time his surgeon Dr. Aguiar was consulted over the phone and recommended provision of reassurance. The patient presents here today with worsening still of his left-sided facial swelling. Swelling is diffuse in the left-sided maxillary region, extending down into the mandible and upper anterior neck. He reports marked discomfort of the left maxillary area. He denies any throat pain, difficulty breathing, trouble swallowing, or voice change. No further eye pain. The patient is concerned as he states he is now unable to occlude. No fevers or chills. The patient state she is not anticoagulated.    No current facility-administered medications for this encounter.      Current Outpatient Prescriptions   Medication     oxyCODONE (ROXICODONE) 5 MG IR tablet     amoxicillin-clavulanate (AUGMENTIN) 875-125 MG per tablet     HYDROcodone-acetaminophen (NORCO) 7.5-325 MG per tablet     chlorhexidine (PERIDEX) 0.12 % solution     IBUPROFEN PO     ciprofloxacin (CIPRO) 500 MG tablet     losartan-hydrochlorothiazide (HYZAAR) 50-12.5 MG per tablet     atenolol (TENORMIN) 25 MG tablet     cetirizine (ZYRTEC) 10 MG tablet     fluticasone (FLONASE) 50 MCG/ACT spray     Esomeprazole Magnesium (NEXIUM PO)     Past Medical History:   Diagnosis Date     Anxiety      GERD (gastroesophageal reflux disease)      Hypertension      Major depressive disorder, recurrent episode, mild (H) 2/13/2012     PTSD (post-traumatic stress disorder) 8/30/2012       Past Surgical History:   Procedure Laterality Date     CHOLECYSTECTOMY  2009     ESOPHAGOSCOPY, GASTROSCOPY, DUODENOSCOPY  (EGD), COMBINED N/A 12/23/2014    Procedure: COMBINED ESOPHAGOSCOPY, GASTROSCOPY, DUODENOSCOPY (EGD), BIOPSY SINGLE OR MULTIPLE;  Surgeon: Mesfin Milian MD;  Location: [facility]     OPEN REDUCTION INTERNAL FIXATION ORBIT BLOWOUT Left 9/5/2017    Procedure: OPEN REDUCTION INTERNAL FIXATION ORBIT BLOWOUT;  Open Reduction Left Eye Blow Out Fracture with Hardware Placement;  Surgeon: Chirag Aguiar MD;  Location:  OR       Family History   Problem Relation Age of Onset     C.A.D. Maternal Grandfather      Prostate Cancer Paternal Grandfather      Hypertension Mother      Hypertension Brother      DIABETES No family hx of        Social History   Substance Use Topics     Smoking status: Former Smoker     Packs/day: 0.00     Years: 20.00     Types: Cigarettes     Smokeless tobacco: Never Used      Comment: smokes 1/month     Alcohol use 0.0 - 1.2 oz/week     0 - 2 Standard drinks or equivalent per week        Allergies   Allergen Reactions     Lisinopril Cough       I have reviewed the Medications, Allergies, Past Medical and Surgical History, and Social History in the Epic system.    Review of Systems   Constitutional: Negative for chills and fever.   HENT: Positive for dental problem (malocclusion) and facial swelling (left-sided). Negative for congestion, sore throat, trouble swallowing and voice change.    Eyes: Negative for redness.   Respiratory: Negative for shortness of breath.    Cardiovascular: Negative for chest pain.   Gastrointestinal: Negative for abdominal pain.   Genitourinary: Negative for difficulty urinating.   Musculoskeletal: Negative for arthralgias and neck stiffness.   Skin: Negative for color change.   Neurological: Negative for headaches.   Psychiatric/Behavioral: Negative for confusion.       Physical Exam   BP: (!) 164/106  Heart Rate: 93  Temp: 97.7  F (36.5  C)  Resp: 18  SpO2: 98 %  Physical Exam   Constitutional: He appears well-developed.   HENT:   Head: Normocephalic.       Eyes:  Left eye exhibits chemosis. Left conjunctiva has a hemorrhage. Left eye exhibits normal extraocular motion.   Neck: Tracheal tenderness present. Carotid bruit is present. No rigidity. No erythema and normal range of motion present.       Pulmonary/Chest: No stridor.   Skin: He is not diaphoretic.       ED Course     ED Course     Procedures               Labs Ordered and Resulted from Time of ED Arrival Up to the Time of Departure from the ED - No data to display         Assessments & Plan (with Medical Decision Making)     52 yr old male 2 days s/p repair of L eye orbital blowout fracture.  On my evaluation he is noted to be alert, afebrile, and hemodynamically stable with mild hypertension.  He appears uncomfortable but is non toxic.  He does have notable L sided facial swelling extending from the L infraorbital region to the upper anterior neck.  No palpable crepitus or overlying erythema; my suspicion for soft tissue or deep space infection developing is low but possible.  No stridor, voice change, no limitation with movement of the neck.  He has no occular entrapment.  Otherwise we discussed possible postoperative edema/bleeding as a cause for swelling with tracking in a gravity dependent manor.     The patient was evaluated by ENT who agree with likely normal postoperative edema.  Request was for Medrol dose pack and follow up in ENT clinic this week.    I have reviewed the nursing notes.    I have reviewed the findings, diagnosis, plan and need for follow up with the patient.    New Prescriptions    No medications on file       Final diagnoses:   Edema, unspecified type     I, Ajit Galarza, am serving as a trained medical scribe to document services personally performed by Nigel George MD, based on the provider's statements to me.      I, Nigel George MD, was physically present and have reviewed and verified the accuracy of this note documented by Ajit Galarza.    9/7/2017   [facility], [place], EMERGENCY  DEPARTMENT     Nigel George MD  09/07/17 [id number]

## 2017-09-07 NOTE — CONSULTS
Otolaryngology Consult Note  September 7, 2017      CC: Left-sided facial swelling    HPI:Mr Samaniego is a 52-year-old male with a PMH significant for PTSD, MDD, HTN, GERD, and LESLIE who is now POD#2 s/p endoscopic transantral approach of ORIF left orbital floor blowout fracture who presents with increasing facial pain and swelling. Patient presented to outside urgent care clinic for increasing swelling and pain. Today, patient reports further increased pain and swelling localized to left maxillary area. Pain is constant, responds slightly to oxycodone, and does not radiate. Patient denies vision changes, difficulty swallowing, and difficulty breathing. Patient denies fever, chills, and night sweats.    Past Medical History:   Diagnosis Date     Anxiety      GERD (gastroesophageal reflux disease)      Hypertension      Major depressive disorder, recurrent episode, mild (H) 2/13/2012     PTSD (post-traumatic stress disorder) 8/30/2012       Past Surgical History:   Procedure Laterality Date     CHOLECYSTECTOMY  2009     ESOPHAGOSCOPY, GASTROSCOPY, DUODENOSCOPY (EGD), COMBINED N/A 12/23/2014    Procedure: COMBINED ESOPHAGOSCOPY, GASTROSCOPY, DUODENOSCOPY (EGD), BIOPSY SINGLE OR MULTIPLE;  Surgeon: Mesfin Milian MD;  Location: [facility]     OPEN REDUCTION INTERNAL FIXATION ORBIT BLOWOUT Left 9/5/2017    Procedure: OPEN REDUCTION INTERNAL FIXATION ORBIT BLOWOUT;  Open Reduction Left Eye Blow Out Fracture with Hardware Placement;  Surgeon: Chirag Aguiar MD;  [facility]       Current Outpatient Prescriptions   Medication Sig Dispense Refill     methylPREDNISolone (MEDROL DOSEPAK) 4 MG tablet Follow package instructions 21 tablet 0     oxyCODONE (ROXICODONE) 5 MG IR tablet Take 1-2 tablets every 3-6 hours as needed for severe pain only 20 tablet 0     amoxicillin-clavulanate (AUGMENTIN) 875-125 MG per tablet Take 1 tablet by mouth 2 times daily for 10 days 19 tablet 0     HYDROcodone-acetaminophen (NORCO) 7.5-325  MG per tablet Take 1-2 tablets by mouth every 4 hours as needed for moderate to severe pain 30 tablet 0     chlorhexidine (PERIDEX) 0.12 % solution Swish and spit 15 mLs in mouth 2 times daily 473 mL 0     IBUPROFEN PO Take 400 mg by mouth every 6 hours as needed for moderate pain       ciprofloxacin (CIPRO) 500 MG tablet Take 1 tablet (500 mg) by mouth 2 times daily 42 tablet 0     losartan-hydrochlorothiazide (HYZAAR) 50-12.5 MG per tablet Take 1 tablet by mouth daily 90 tablet 3     atenolol (TENORMIN) 25 MG tablet Take 1 tablet (25 mg) by mouth daily 90 tablet 3     cetirizine (ZYRTEC) 10 MG tablet Take 1 tablet (10 mg) by mouth every evening 30 tablet 1     fluticasone (FLONASE) 50 MCG/ACT spray Spray 1-2 sprays into both nostrils daily 1 Bottle 11     Esomeprazole Magnesium (NEXIUM PO) Take 20 mg by mouth daily             Allergies   Allergen Reactions     Lisinopril Cough       Social History     Social History     Marital status:      Spouse name: Simran Prater     Number of children: 2     Years of education: 12     Occupational History     [employer]     Social History Main Topics     Smoking status: Former Smoker     Packs/day: 0.00     Years: 20.00     Types: Cigarettes     Smokeless tobacco: Never Used      Comment: smokes 1/month     Alcohol use 0.0 - 1.2 oz/week     0 - 2 Standard drinks or equivalent per week     Drug use: No     Sexual activity: Yes     Partners: Female     Other Topics Concern     Parent/Sibling W/ Cabg, Mi Or Angioplasty Before 65f 55m? No     Social History Narrative       Family History   Problem Relation Age of Onset     C.A.D. Maternal Grandfather      Prostate Cancer Paternal Grandfather      Hypertension Mother      Hypertension Brother      DIABETES No family hx of        REVIEW OF SYSTEMS:  General: negative fever, chills, and night sweats  Neurologic: negative for new and changed headaches  HEENT: negative for difficulty  swallowing  Respiratory: negative for SOB      PHYSICAL EXAM:  BP (!) 167/118  Temp 97.7  F (36.5  C) (Oral)  Resp 18  SpO2 98%  General:   Normal-appearing male; NAD, age-appropriate appearance, well-developed, normal habitus   Communication:   Normal; communicates verbally, normal voice quality   Head/Face:  Inspection-  Left intraorbital ecchymoses; marked swelling of left lateral orbit, left maxilla down to mandible  Palpation- No sinus tenderness; no palpable bony deformities  Salivary glands-  Normal size, no tenderness, swelling, or palpable masses  Facial strength-  Normal and symmetric bilateral; H/B I/VI   Skin: Normal, no rashes noted on extremities   Eyes: Chemosis of bilateral eyes; subconjunctival hemorrhage of left eye; extraocular muscles intact; no nystagmus on extraocular movement; visual acuity intact   Ears: Right Auricle- Normal  Right EAC- Normal  Right TM- Normal, no sign of middle ear effusion    Left Auricle- Normal  Left EAC- Normal  Left TM- Normal, no sign of middle ear effusion  Normal clinical speech reception   Nose: External inspection- Normal  Internal Inspection- Normal pink and moist mucosa; septum midline and without perforation or hematoma; bilateral inferior turbinate hypertrophy   Oral Cavity: Lips-  Normal mucosa, oral competence, and stoma size  Age-appropriate dentition, healthy gingival mucosa  Hard palate, buccal, floor of mouth mucosa normal; left maxillary sulcus incision is well-approximated without bleeding or drainage  Tongue- normal movement, no lesions, normal sensation   Neck: No visible mass or asymmetry  Normal palpation, no tenderness, no tracheal deviation  Thyroid-  Normal  Normal range of motion   Lymphatic: No palpable or tender lymphadenopathy   Neurologic: CNN II-XII- Grossly intact   Psychiatric: Mood/affect-  Normal  Mental status-  Normal       Assessment and Plan  Mr. Samaniego is a 52-year-old male who is now POD#2 s/p endoscopic transantral approach  of ORIF of left orbital floor blowout fracture with Dr. Aguiar who presents with increased pain and swelling of left maxilla, likely related to operative manipulation. There is currently little concern for extraocular muscle entrapment or compression of the globe, as patient has had no changes in visual acuity, denies diplopia and blurry vision, and he can move both eyes in all directions. Patient is still able to tolerate PO intake and has not had any respiratory difficulty.    - Dr. Aguiar has been notified of patient's presentation to the ED  - Recommend medrol dose pack  - Patient counseled to expect increasing pain and swelling for the first few post-operative days, and instructed to return to the ED if he develops any visual changes or difficulty breathing  - Patient instructed to use ice compresses to left maxilla to reduce swelling  - Patient provided with a letter to return to work on 9/17/17      Merly Rivera MD  PGY-1, Otolaryngology

## 2017-09-07 NOTE — ED PROVIDER NOTES
History     Chief Complaint   Patient presents with     Post-op Problem     titanium plate placed/ facial injury 2 weeks ago     HPI  Sunny Samaniego is a 52 year old male who presents to the emergency room with swelling to his left cheek area. The patient had a facial injury including blowout fracture of his left orbit 2 weeks ago. He had corrective surgery yesterday. Today around 3 PM he noticed a lot of swelling to his left cheek. He is having no difficulty with his vision. His extraocular movements were fully intact after surgery and he has noticed no change in this. He is having a little more blood out of his left nares. His pain is not very well controlled with the hydrocodone he was prescribed. He has been cautious not to blow his nose or sneeze. He does not believe he is a backwards to cause this. He was forewarned that this might happen if he blew his nose so he is not been.    Patient Active Problem List   Diagnosis     Liver lesion     CARDIOVASCULAR SCREENING; LDL GOAL LESS THAN 130     HTN (hypertension)     Microscopic hematuria     Elevated fasting glucose     Anxiety state     Long-term current use of opiate analgesic     [facility]     AC (acromioclavicular) joint arthritis     Esophageal reflux     Acute upper GI bleed     Syncope     Pain in joint of right shoulder     Need for prophylactic vaccination and inoculation against influenza     Past Medical History:   Diagnosis Date     Anxiety      GERD (gastroesophageal reflux disease)      Hypertension      Major depressive disorder, recurrent episode, mild (H) 2/13/2012     PTSD (post-traumatic stress disorder) 8/30/2012     No current facility-administered medications for this encounter.      Current Outpatient Prescriptions   Medication     oxyCODONE (ROXICODONE) 5 MG IR tablet     HYDROcodone-acetaminophen (NORCO) 7.5-325 MG per tablet     chlorhexidine (PERIDEX) 0.12 % solution     IBUPROFEN PO     amoxicillin-clavulanate (AUGMENTIN)  "875-125 MG per tablet     ciprofloxacin (CIPRO) 500 MG tablet     losartan-hydrochlorothiazide (HYZAAR) 50-12.5 MG per tablet     atenolol (TENORMIN) 25 MG tablet     cetirizine (ZYRTEC) 10 MG tablet     fluticasone (FLONASE) 50 MCG/ACT spray     Esomeprazole Magnesium (NEXIUM PO)     Allergies   Allergen Reactions     Lisinopril Cough     Past Surgical History:   Procedure Laterality Date     CHOLECYSTECTOMY  2009     ESOPHAGOSCOPY, GASTROSCOPY, DUODENOSCOPY (EGD), COMBINED N/A 12/23/2014    Procedure: COMBINED ESOPHAGOSCOPY, GASTROSCOPY, DUODENOSCOPY (EGD), BIOPSY SINGLE OR MULTIPLE;  Surgeon: Mesfin Milian MD;  Location: [facility]     OPEN REDUCTION INTERNAL FIXATION ORBIT BLOWOUT Left 9/5/2017    Procedure: OPEN REDUCTION INTERNAL FIXATION ORBIT BLOWOUT;  Open Reduction Left Eye Blow Out Fracture with Hardware Placement;  Surgeon: Chirag Aguiar MD;  Location:  OR     Social History     Social History     Marital status:      Spouse name: Simran Prater     Number of children: 2     Years of education: 12     Occupational History     [employer]     Social History Main Topics     Smoking status: Former Smoker     Packs/day: 0.00     Years: 20.00     Types: Cigarettes     Smokeless tobacco: Never Used      Comment: smokes 1/month     Alcohol use 0.0 - 1.2 oz/week     0 - 2 Standard drinks or equivalent per week     Drug use: No     Sexual activity: Yes     Partners: Female     Other Topics Concern     Parent/Sibling W/ Cabg, Mi Or Angioplasty Before 65f 55m? No     Social History Narrative         I have reviewed the Medications, Allergies, Past Medical and Surgical History, and Social History in the Epic system.         Review of Systems   All other systems reviewed and are negative.      Physical Exam   BP: (!) 136/99  Pulse: 77  Heart Rate: 77  Temp: 97.5  F (36.4  C)  Resp: 20  Height: 175.3 cm (5' 9\")  Weight: 79.4 kg (175 lb)  SpO2: 100 %  Physical Exam   Constitutional: He " is oriented to person, place, and time. He appears well-developed and well-nourished.   HENT:   The patient's left cheek area is larger as compared to the right cheek area. This was an abrupt change so I suspect related to air trapped. There is no redness warmth or tenderness to this area. There is no crepitus. The wound inside his mouth-upper lip appears to be intact and healing well. There is no drainage.   Eyes: Conjunctivae and EOM are normal. Pupils are equal, round, and reactive to light.   Left eye does show some subconjunctival hemorrhage. Extraocular movements were checked and rechecked twice without any restriction. Patient has no symptoms of entrapment. Vision is normal. Pupils equal react to accommodate.   Neck: Normal range of motion.   Cardiovascular: Normal rate.    Pulmonary/Chest: Effort normal.   Musculoskeletal: Normal range of motion.   Neurological: He is alert and oriented to person, place, and time.   Skin: Skin is warm and dry.   Psychiatric: He has a normal mood and affect. His behavior is normal.   Vitals reviewed.      ED Course     ED Course     Procedures             Critical Care time:  none               Labs Ordered and Resulted from Time of ED Arrival Up to the Time of Departure from the ED - No data to display    Assessments & Plan (with Medical Decision Making)   MDM--52-year-old male who is one day post operative surgical repair of a left maxillary fracture and blowout fracture of his left orbit. He had 2 very small plates placed. He noticed some swelling to his left cheek which is secondary to air in the soft tissue. He has no fever or sign of infection and it is too early. He is on Augmentin prophylactically. I discussed the case with his surgeon . He recommended reassuring the patient but again reinforcing that he is not to blow his nose. The ear does not need to be decompressed. His extraocular movements were checked and rechecked twice and are fully intact. He is to  continue his antibiotic. He also recommended starting some Afrin spray. All this was discussed with the patient and his wife and they are comfortable with this evaluation treatment and discharge plan. He can also be seen in the clinic tomorrow by one of Dr. Leonela Luther.  I have reviewed the nursing notes.    I have reviewed the findings, diagnosis, plan and need for follow up with the patient.       Discharge Medication List as of 9/6/2017  6:15 PM      START taking these medications    Details   oxyCODONE (ROXICODONE) 5 MG IR tablet Take 1-2 tablets every 3-6 hours as needed for severe pain only, Disp-20 tablet, R-0, Local Print             Final diagnoses:   History of facial surgery   Swelling of left side of face       9/6/2017   [facility] EMERGENCY DEPARTMENT     Hema, Moustapha RITCHIE MD  09/07/17 0101

## 2017-09-07 NOTE — PROGRESS NOTES
"Patient calling-ED note from 9/6, [facility]  52-year-old male who is one day post operative surgical repair of a left maxillary fracture and blowout fracture of his left orbit. He had 2 very small plates placed. He noticed some swelling to his left cheek which is secondary to air in the soft tissue. He has no fever or sign of infection and it is too early. He is on Augmentin prophylactically. I discussed the case with his surgeon . He recommended reassuring the patient but again reinforcing that he is not to blow his nose. The ear does not need to be decompressed. His extraocular movements were checked and rechecked twice and are fully intact. He is to continue his antibiotic. He also recommended starting some Afrin spray. All this was discussed with the patient and his wife and they are comfortable with this evaluation treatment and discharge plan. He can also be seen in the clinic tomorrow by one of Dr. [facility].    Patient states swelling has increased to the point \"it cannot swell anymore\" and though he understands he is not to blow his nose and that there might not be anything we can do, but swelling is so much worse than yesterday that he wants someone to look at it. Patient is planning on going to ED. Patient is calm but concerned.    Patient instructed to come to [facility] ED.     "

## 2017-09-07 NOTE — ED NOTES
Pt presents to ED for complaint of facial swelling. Pt had surgery to repair broken orbital fracture two days ago and swelling was minimal. Last night the swelling began and pt states in about 30 minutes it became very swollen and then has slowly gotten worse and is spreading down to the neck. No complaints of difficulty breathing, VSS

## 2017-09-14 ENCOUNTER — OFFICE VISIT (OUTPATIENT)
Dept: FAMILY MEDICINE | Facility: CLINIC | Age: 52
End: 2017-09-14
Payer: COMMERCIAL

## 2017-09-14 DIAGNOSIS — Z79.891 LONG-TERM CURRENT USE OF OPIATE ANALGESIC: Chronic | ICD-10-CM

## 2017-09-14 DIAGNOSIS — M19.019 AC (ACROMIOCLAVICULAR) JOINT ARTHRITIS: ICD-10-CM

## 2017-09-14 DIAGNOSIS — I10 BENIGN ESSENTIAL HYPERTENSION: ICD-10-CM

## 2017-09-14 PROCEDURE — 99214 OFFICE O/P EST MOD 30 MIN: CPT | Performed by: FAMILY MEDICINE

## 2017-09-14 RX ORDER — ATENOLOL 25 MG/1
25 TABLET ORAL DAILY
Qty: 90 TABLET | Refills: 3 | Status: CANCELLED | OUTPATIENT
Start: 2017-09-14

## 2017-09-14 RX ORDER — LOSARTAN POTASSIUM AND HYDROCHLOROTHIAZIDE 12.5; 5 MG/1; MG/1
1 TABLET ORAL DAILY
Qty: 90 TABLET | Refills: 3 | Status: SHIPPED | OUTPATIENT
Start: 2017-09-14 | End: 2018-10-03

## 2017-09-14 RX ORDER — LOSARTAN POTASSIUM AND HYDROCHLOROTHIAZIDE 12.5; 5 MG/1; MG/1
1 TABLET ORAL DAILY
Qty: 90 TABLET | Refills: 3 | Status: CANCELLED | OUTPATIENT
Start: 2017-09-14

## 2017-09-14 RX ORDER — ATENOLOL 100 MG/1
100 TABLET ORAL DAILY
Qty: 30 TABLET | Refills: 1 | Status: SHIPPED | OUTPATIENT
Start: 2017-09-14 | End: 2018-10-03

## 2017-09-14 RX ORDER — HYDROCODONE BITARTRATE AND ACETAMINOPHEN 7.5; 325 MG/1; MG/1
1-2 TABLET ORAL EVERY 4 HOURS PRN
Qty: 30 TABLET | Refills: 0 | Status: SHIPPED | OUTPATIENT
Start: 2017-09-14 | End: 2017-11-15

## 2017-09-14 NOTE — MR AVS SNAPSHOT
After Visit Summary   9/14/2017    Sunny Samaniego    MRN: [id number]           Patient Information     Date Of Birth          1965        Visit Information        Provider Department      9/14/2017 2:40 PM Mark Melgoza MD [facility]        Today's Diagnoses     Benign essential hypertension        AC (acromioclavicular) joint arthritis        Long-term current use of opiate analgesic           Follow-ups after your visit        Your next 10 appointments already scheduled     Sep 20, 2017 11:15 AM CDT   (Arrive by 11:00 AM)   Return Visit with Chirag Aguiar MD   [facility] ([facility] [place])    [address]  4th Floor  [facility] [address] [phone]            Sep 21, 2017  8:30 AM CDT   SHORT with Talia Bhandrai MD   [facility]    [address] [address] [phone]              Who to contact     Normal or non-critical lab and imaging results will be communicated to you by LikeAndyhart, letter or phone within 4 business days after the clinic has received the results. If you do not hear from us within 7 days, please contact the clinic through Opti-Logict or phone. If you have a critical or abnormal lab result, we will notify you by phone as soon as possible.  Submit refill requests through Axxana or call your pharmacy and they will forward the refill request to us. Please allow 3 business days for your refill to be completed.          If you need to speak with a  for additional information , please call: [phone]             Additional Information About Your Visit        Axxana Information     Axxana gives you secure access to your electronic health record. If you see a primary care provider, you can also send messages to your care team and make appointments. If you have questions, please call your primary care clinic.  If you do not have a  "primary care provider, please call [phone] and they will assist you.        Care EveryWhere ID     This is your Care EveryWhere ID. This could be used by other organizations to access your [facility] medical records  [id number]        Your Vitals Were     Pulse Temperature Height BMI (Body Mass Index)          117 97.8  F (36.6  C) (Tympanic) 5' 8\" (1.727 m) 27.44 kg/m2         Blood Pressure from Last 3 Encounters:   09/17/17 138/88   09/07/17 (!) 167/118   09/06/17 (!) 136/99    Weight from Last 3 Encounters:   09/14/17 180 lb 8 oz (81.9 kg)   09/06/17 175 lb (79.4 kg)   09/05/17 180 lb 1.9 oz (81.7 kg)              Today, you had the following     No orders found for display         Today's Medication Changes          These changes are accurate as of: 9/14/17 11:59 PM.  If you have any questions, ask your nurse or doctor.               These medicines have changed or have updated prescriptions.        Dose/Directions    * atenolol 25 MG tablet   Commonly known as:  TENORMIN   This may have changed:  Another medication with the same name was added. Make sure you understand how and when to take each.   Used for:  Benign essential hypertension, Allergic rhinitis due to pollen, unspecified rhinitis seasonality   Changed by:  Talia Bhandari MD        Dose:  25 mg   Take 1 tablet (25 mg) by mouth daily   Quantity:  90 tablet   Refills:  3       * atenolol 100 MG tablet   Commonly known as:  TENORMIN   This may have changed:  You were already taking a medication with the same name, and this prescription was added. Make sure you understand how and when to take each.   Used for:  Benign essential hypertension   Changed by:  Mark Melgoza MD        Dose:  100 mg   Take 1 tablet (100 mg) by mouth daily   Quantity:  30 tablet   Refills:  1       * Notice:  This list has 2 medication(s) that are the same as other medications prescribed for you. Read the directions carefully, and ask your doctor or other care " provider to review them with you.         Where to get your medicines      These medications were sent to [id number] - [place], [place] - [address]  [address], [place] [address]     Phone:  [phone]     atenolol 100 MG tablet    losartan-hydrochlorothiazide 50-12.5 MG per tablet         Some of these will need a paper prescription and others can be bought over the counter.  Ask your nurse if you have questions.     Bring a paper prescription for each of these medications     HYDROcodone-acetaminophen 7.5-325 MG per tablet                Primary Care Provider Office Phone # Fax #    Talia Bhandari [phone] [address] [facility] [address]        Equal Access to Services     MARY MORSE : Hadii carmen gunter hadasho Soomaali, waaxda luqadaha, qaybta kaalmada adeegyada, shannon lloyd . So [facility] [phone].    ATENCIÓN: Si habla español, tiene a lozada disposición servicios gratuitos de asistencia lingüística. [facility] [phone].    We comply with applicable federal civil rights laws and [place] laws. We do not discriminate on the basis of race, color, national origin, age, disability sex, sexual orientation or gender identity.            Thank you!     Thank you for choosing [facility]  for your care. Our goal is always to provide you with excellent care. Hearing back from our patients is one way we can continue to improve our services. Please take a few minutes to complete the written survey that you may receive in the mail after your visit with us. Thank you!             Your Updated Medication List - Protect others around you: Learn how to safely use, store and throw away your medicines at www.disposemymeds.org.          This list is accurate as of: 9/14/17 11:59 PM.  Always use your most recent med list.                   Brand Name Dispense Instructions for use Diagnosis    * atenolol 25 MG tablet     TENORMIN    90 tablet    Take 1 tablet (25 mg) by mouth daily    Benign essential hypertension, Allergic rhinitis due to pollen, unspecified rhinitis seasonality       * atenolol 100 MG tablet    TENORMIN    30 tablet    Take 1 tablet (100 mg) by mouth daily    Benign essential hypertension       cetirizine 10 MG tablet    zyrTEC    30 tablet    Take 1 tablet (10 mg) by mouth every evening    Allergic rhinitis due to pollen, unspecified rhinitis seasonality       chlorhexidine 0.12 % solution    PERIDEX    473 mL    Swish and spit 15 mLs in mouth 2 times daily    Fracture of left orbital floor, initial encounter (H)       fluticasone 50 MCG/ACT spray    FLONASE    1 Bottle    Spray 1-2 sprays into both nostrils daily    Allergic rhinitis due to pollen, unspecified rhinitis seasonality       HYDROcodone-acetaminophen 7.5-325 MG per tablet    NORCO    30 tablet    Take 1-2 tablets by mouth every 4 hours as needed for moderate to severe pain    AC (acromioclavicular) joint arthritis, Long-term current use of opiate analgesic       IBUPROFEN PO      Take 400 mg by mouth every 6 hours as needed for moderate pain        losartan-hydrochlorothiazide 50-12.5 MG per tablet    HYZAAR    90 tablet    Take 1 tablet by mouth daily    Benign essential hypertension       methylPREDNISolone 4 MG tablet    MEDROL DOSEPAK    21 tablet    Follow package instructions        NEXIUM PO      Take 20 mg by mouth daily        oxyCODONE 5 MG IR tablet    ROXICODONE    20 tablet    Take 1-2 tablets every 3-6 hours as needed for severe pain only        * Notice:  This list has 2 medication(s) that are the same as other medications prescribed for you. Read the directions carefully, and ask your doctor or other care provider to review them with you.

## 2017-09-14 NOTE — PROGRESS NOTES
SUBJECTIVE:                                                    Sunny Samaniego is a 52 year old male who presents to clinic today for the following health issues:    Hypertension Follow-up      Outpatient blood pressures are not being checked.    Low Salt Diet: not monitoring salt      Amount of exercise or physical activity: None outside of yard work    Problems taking medications regularly: No    Medication side effects: none  Diet: regular (no restrictions)    **Also would like his pain medication refilled.    Problem list and histories reviewed & adjusted, as indicated.  Additional history:     Patient Active Problem List   Diagnosis     Liver lesion     CARDIOVASCULAR SCREENING; LDL GOAL LESS THAN 130     HTN (hypertension)     Microscopic hematuria     Elevated fasting glucose     Anxiety state     Long-term current use of opiate analgesic     [facility]     AC (acromioclavicular) joint arthritis     Esophageal reflux     Acute upper GI bleed     Syncope     Pain in joint of right shoulder     Need for prophylactic vaccination and inoculation against influenza     Past Surgical History:   Procedure Laterality Date     CHOLECYSTECTOMY  2009     ESOPHAGOSCOPY, GASTROSCOPY, DUODENOSCOPY (EGD), COMBINED N/A 12/23/2014    Procedure: COMBINED ESOPHAGOSCOPY, GASTROSCOPY, DUODENOSCOPY (EGD), BIOPSY SINGLE OR MULTIPLE;  Surgeon: Mesfin Milian MD;  Location: [facility]     OPEN REDUCTION INTERNAL FIXATION ORBIT BLOWOUT Left 9/5/2017    Procedure: OPEN REDUCTION INTERNAL FIXATION ORBIT BLOWOUT;  Open Reduction Left Eye Blow Out Fracture with Hardware Placement;  Surgeon: Chirag Aguiar MD;  Location:  OR       Social History   Substance Use Topics     Smoking status: Former Smoker     Packs/day: 0.00     Years: 20.00     Types: Cigarettes     Smokeless tobacco: Never Used      Comment: smokes 1/month     Alcohol use 0.0 - 1.2 oz/week     0 - 2 Standard drinks or equivalent per week      Comment:  "occassional     Family History   Problem Relation Age of Onset     C.A.D. Maternal Grandfather      Prostate Cancer Paternal Grandfather      Hypertension Mother      Hypertension Brother      DIABETES No family hx of              ROS:  Constitutional, HEENT, cardiovascular, pulmonary, gi and gu systems are negative, except as otherwise noted.      OBJECTIVE:                                                    /88  Pulse 117  Temp 97.8  F (36.6  C) (Tympanic)  Ht 5' 8\" (1.727 m)  Wt 180 lb 8 oz (81.9 kg)  BMI 27.44 kg/m2 Body mass index is 27.44 kg/(m^2).   GENERAL: healthy, alert, well nourished, well hydrated, no distress  HENT: ear canals- normal; TMs- normal; Nose- normal; Mouth- no ulcers, no lesions  NECK: no tenderness, no adenopathy, no asymmetry, no masses, no stiffness; thyroid- normal to palpation  RESP: lungs clear to auscultation - no rales, no rhonchi, no wheezes  CV: regular rates and rhythm, normal S1 S2, no S3 or S4 and no murmur, no click or rub -  ABDOMEN: soft, no tenderness, no  hepatosplenomegaly, no masses, normal bowel sounds       ASSESSMENT/PLAN:                                                      (I10) Benign essential hypertension  Plan: atenolol (TENORMIN) 100 MG tablet,         losartan-hydrochlorothiazide (HYZAAR) 50-12.5         MG per tablet      (M19.019) AC (acromioclavicular) joint arthritis    Plan: HYDROcodone-acetaminophen (NORCO) 7.5-325 MG         per tablet    (Z79.891) Long-term current use of opiate analgesic  Plan: HYDROcodone-acetaminophen (NORCO) 7.5-325 MG         per tablet       reports that he has quit smoking. His smoking use included Cigarettes. He smoked 0.00 packs per day for 20.00 years. He has never used smokeless tobacco.      Weight management plan: Discussed healthy diet and exercise guidelines and patient will follow up in 6 months in clinic to re-evaluate.      [facility]    "

## 2017-09-14 NOTE — NURSING NOTE
"Chief Complaint   Patient presents with     Recheck Medication       Initial BP (!) 149/100 (BP Location: Right arm, Patient Position: Sitting, Cuff Size: Adult Large)  Pulse 117  Temp 97.8  F (36.6  C) (Tympanic)  Ht 5' 8\" (1.727 m)  Wt 180 lb 8 oz (81.9 kg)  BMI 27.44 kg/m2 Estimated body mass index is 27.44 kg/(m^2) as calculated from the following:    Height as of this encounter: 5' 8\" (1.727 m).    Weight as of this encounter: 180 lb 8 oz (81.9 kg).  Medication Reconciliation: complete   Danielle Elkins CMA  "

## 2017-09-17 VITALS
SYSTOLIC BLOOD PRESSURE: 138 MMHG | TEMPERATURE: 97.8 F | DIASTOLIC BLOOD PRESSURE: 88 MMHG | HEART RATE: 117 BPM | BODY MASS INDEX: 27.35 KG/M2 | WEIGHT: 180.5 LBS | HEIGHT: 68 IN

## 2017-09-21 ENCOUNTER — OFFICE VISIT (OUTPATIENT)
Dept: FAMILY MEDICINE | Facility: CLINIC | Age: 52
End: 2017-09-21
Payer: COMMERCIAL

## 2017-09-21 VITALS
DIASTOLIC BLOOD PRESSURE: 89 MMHG | SYSTOLIC BLOOD PRESSURE: 130 MMHG | HEIGHT: 68 IN | WEIGHT: 181.8 LBS | TEMPERATURE: 97.8 F | BODY MASS INDEX: 27.55 KG/M2 | HEART RATE: 88 BPM

## 2017-09-21 DIAGNOSIS — Z12.11 SCREEN FOR COLON CANCER: ICD-10-CM

## 2017-09-21 DIAGNOSIS — M19.019 AC (ACROMIOCLAVICULAR) JOINT ARTHRITIS: ICD-10-CM

## 2017-09-21 DIAGNOSIS — Z79.891 LONG-TERM CURRENT USE OF OPIATE ANALGESIC: Chronic | ICD-10-CM

## 2017-09-21 DIAGNOSIS — M54.10 NERVE ROOT PAIN: Primary | ICD-10-CM

## 2017-09-21 DIAGNOSIS — S02.30XA ORBITAL FLOOR (BLOW-OUT) CLOSED FRACTURE (H): ICD-10-CM

## 2017-09-21 PROCEDURE — 99214 OFFICE O/P EST MOD 30 MIN: CPT | Performed by: FAMILY MEDICINE

## 2017-09-21 RX ORDER — HYDROCODONE BITARTRATE AND ACETAMINOPHEN 7.5; 325 MG/1; MG/1
1 TABLET ORAL EVERY 8 HOURS PRN
Qty: 60 TABLET | Refills: 0 | Status: SHIPPED | OUTPATIENT
Start: 2017-09-21 | End: 2017-09-21

## 2017-09-21 RX ORDER — HYDROCODONE BITARTRATE AND ACETAMINOPHEN 7.5; 325 MG/1; MG/1
1 TABLET ORAL EVERY 8 HOURS PRN
Qty: 60 TABLET | Refills: 0 | Status: SHIPPED | OUTPATIENT
Start: 2017-11-20 | End: 2017-12-18

## 2017-09-21 RX ORDER — PREGABALIN 50 MG/1
50 CAPSULE ORAL 3 TIMES DAILY
Qty: 90 CAPSULE | Refills: 1 | Status: SHIPPED | OUTPATIENT
Start: 2017-09-21 | End: 2017-11-15

## 2017-09-21 RX ORDER — HYDROCODONE BITARTRATE AND ACETAMINOPHEN 7.5; 325 MG/1; MG/1
1 TABLET ORAL EVERY 8 HOURS PRN
Qty: 60 TABLET | Refills: 0 | Status: SHIPPED | OUTPATIENT
Start: 2017-10-20 | End: 2017-09-21

## 2017-09-21 NOTE — PROGRESS NOTES
SUBJECTIVE:                                                    Sunny Samaniego is a 52 year old male who presents to clinic today for the following health issues:    Hypertension Follow-up    Outpatient blood pressures are not being checked.    Low Salt Diet: low salt    BP Readings from Last 6 Encounters:   09/21/17 130/89   09/17/17 138/88   09/07/17 (!) 167/118   09/06/17 (!) 136/99   09/05/17 (!) 140/94   08/30/17 (!) 165/113       Pain medication refill       Problem list and histories reviewed & adjusted, as indicated.  Additional history: he is here after the significant eye injury had surgery 9/6 for the orbital blow out fracture and he is recovering from this   He has been doing ok still has minimal feeling around that part of r the face   He has been feeling better has used more pain medications as expected after this injury  He has now been having stinging pain down into the teeth the last few days   Concerned about the nerve pain s  Discussed narcotics and nerve pain and the lack of efficacy for this .   Had stopped his blood pressure med but now has restarted             Patient Active Problem List   Diagnosis     Liver lesion     CARDIOVASCULAR SCREENING; LDL GOAL LESS THAN 130     HTN (hypertension)     Microscopic hematuria     Elevated fasting glucose     Anxiety state     Long-term current use of opiate analgesic     [facility]     AC (acromioclavicular) joint arthritis     Esophageal reflux     Acute upper GI bleed     Syncope     Pain in joint of right shoulder     Need for prophylactic vaccination and inoculation against influenza     Past Surgical History:   Procedure Laterality Date     CHOLECYSTECTOMY  2009     ESOPHAGOSCOPY, GASTROSCOPY, DUODENOSCOPY (EGD), COMBINED N/A 12/23/2014    Procedure: COMBINED ESOPHAGOSCOPY, GASTROSCOPY, DUODENOSCOPY (EGD), BIOPSY SINGLE OR MULTIPLE;  Surgeon: Mesfin Milian MD;  Location: [facility]     OPEN REDUCTION INTERNAL FIXATION ORBIT BLOWOUT Left  "9/5/2017    Procedure: OPEN REDUCTION INTERNAL FIXATION ORBIT BLOWOUT;  Open Reduction Left Eye Blow Out Fracture with Hardware Placement;  Surgeon: Chirag Aguiar MD;  Location:  OR       Social History   Substance Use Topics     Smoking status: Former Smoker     Packs/day: 0.00     Years: 20.00     Types: Cigarettes     Smokeless tobacco: Never Used      Comment: smokes 1/month     Alcohol use 0.0 - 1.2 oz/week     0 - 2 Standard drinks or equivalent per week      Comment: occassional     Family History   Problem Relation Age of Onset     C.A.D. Maternal Grandfather      Prostate Cancer Paternal Grandfather      Hypertension Mother      Hypertension Brother      DIABETES No family hx of              ROS:  Constitutional, HEENT, cardiovascular, pulmonary, gi and gu systems are negative, except as otherwise noted.      OBJECTIVE:                                                    /89 (BP Location: Right arm, Patient Position: Chair, Cuff Size: Adult Regular)  Pulse 88  Temp 97.8  F (36.6  C) (Tympanic)  Ht 5' 8\" (1.727 m)  Wt 181 lb 12.8 oz (82.5 kg)  BMI 27.64 kg/m2 Body mass index is 27.64 kg/(m^2).   GENERAL APPEARANCE: healthy, alert and no distress  EYES:PERRL,left eye with red conjunctiva swollen periorbial with old bruising normal and all muscle groups intact numb left cheek   HENT: ear canals and TM's normal and nose and mouth without ulcers or lesions  NECK: no adenopathy, no asymmetry, masses, or scars and thyroid normal to palpation  RESP: lungs clear to auscultation - no rales, rhonchi or wheezes  CV: regular rates and rhythm, normal S1 S2, no S3 or S4 and no murmur, click or rub  Skin healing ecchymosis left face no erythema or signs of infection  Decreased sensation left face      ASSESSMENT/PLAN:                                                    1. Nerve root pain  Will start lyrica as patient having nerve pain and he has the chorinc right shoulder pain     - pregabalin (LYRICA) " 50 MG capsule; Take 1 capsule (50 mg) by mouth 3 times daily  Dispense: 90 capsule; Refill: 1    2. Orbital floor (blow-out) closed fracture (H)  Follow up with Dr. Aguiar as planned     3. AC (acromioclavicular) joint arthritis    - HYDROcodone-acetaminophen (NORCO) 7.5-325 MG per tablet; Take 1 tablet by mouth every 8 hours as needed for moderate to severe pain  Dispense: 60 tablet; Refill: 0    4. Screen for colon cancer    - Fecal colorectal cancer screen (FIT); Future    5. Long-term current use of opiate analgesic  Will cont at this amount for the next 3 months as he recovers from surgery   - HYDROcodone-acetaminophen (NORCO) 7.5-325 MG per tablet; Take 1 tablet by mouth every 8 hours as needed for moderate to severe pain  Dispense: 60 tablet; Refill: 0     reports that he has quit smoking. His smoking use included Cigarettes. He smoked 0.00 packs per day for 20.00 years. He has never used smokeless tobacco.          Talia Bhandari M.D.  [facility]

## 2017-09-21 NOTE — MR AVS SNAPSHOT
After Visit Summary   9/21/2017    Sunny Samaniego    MRN: [id number]           Patient Information     Date Of Birth          1965        Visit Information        Provider Department      9/21/2017 8:30 AM Talia Bhandari MD [facility]        Today's Diagnoses     Nerve root pain    -  1    Orbital floor (blow-out) closed fracture (H)        AC (acromioclavicular) joint arthritis        Screen for colon cancer        Long-term current use of opiate analgesic           Follow-ups after your visit        Follow-up notes from your care team     Return in about 3 months (around 12/21/2017), or if symptoms worsen or fail to improve.      Future tests that were ordered for you today     Open Future Orders        Priority Expected Expires Ordered    Fecal colorectal cancer screen (FIT) Routine 10/12/2017 12/14/2017 9/21/2017            Who to contact     Normal or non-critical lab and imaging results will be communicated to you by Vestechart, letter or phone within 4 business days after the clinic has received the results. If you do not hear from us within 7 days, please contact the clinic through Vestechart or phone. If you have a critical or abnormal lab result, we will notify you by phone as soon as possible.  Submit refill requests through [email or web address] or call your pharmacy and they will forward the refill request to us. Please allow 3 business days for your refill to be completed.          If you need to speak with a  for additional information , please call: [phone]             Additional Information About Your Visit        VestecharChairish Information     [email or web address] gives you secure access to your electronic health record. If you see a primary care provider, you can also send messages to your care team and make appointments. If you have questions, please call your primary care clinic.  If you do not have a primary care provider, please call [phone] and they will assist you.    "     Care EveryWhere ID     This is your Care EveryWhere ID. This could be used by other organizations to access your [place] medical records  [id number]        Your Vitals Were     Pulse Temperature Height BMI (Body Mass Index)          88 97.8  F (36.6  C) (Tympanic) 5' 8\" (1.727 m) 27.64 kg/m2         Blood Pressure from Last 3 Encounters:   09/21/17 130/89   09/17/17 138/88   09/07/17 (!) 167/118    Weight from Last 3 Encounters:   09/21/17 181 lb 12.8 oz (82.5 kg)   09/14/17 180 lb 8 oz (81.9 kg)   09/06/17 175 lb (79.4 kg)                 Today's Medication Changes          These changes are accurate as of: 9/21/17 11:52 AM.  If you have any questions, ask your nurse or doctor.               Start taking these medicines.        Dose/Directions    pregabalin 50 MG capsule   Commonly known as:  LYRICA   Used for:  Nerve root pain   Started by:  Talia Bhandari MD        Dose:  50 mg   Take 1 capsule (50 mg) by mouth 3 times daily   Quantity:  90 capsule   Refills:  1         These medicines have changed or have updated prescriptions.        Dose/Directions    * HYDROcodone-acetaminophen 7.5-325 MG per tablet   Commonly known as:  NORCO   This may have changed:  Another medication with the same name was added. Make sure you understand how and when to take each.   Used for:  AC (acromioclavicular) joint arthritis, Long-term current use of opiate analgesic   Changed by:  Mark Melgoza MD        Dose:  1-2 tablet   Take 1-2 tablets by mouth every 4 hours as needed for moderate to severe pain   Quantity:  30 tablet   Refills:  0       * HYDROcodone-acetaminophen 7.5-325 MG per tablet   Commonly known as:  NORCO   This may have changed:  You were already taking a medication with the same name, and this prescription was added. Make sure you understand how and when to take each.   Used for:  AC (acromioclavicular) joint arthritis, Long-term current use of opiate analgesic   Changed by:  Talia Bhandari MD     "    Dose:  1 tablet   Start taking on:  11/20/2017   Take 1 tablet by mouth every 8 hours as needed for moderate to severe pain   Quantity:  60 tablet   Refills:  0       * Notice:  This list has 2 medication(s) that are the same as other medications prescribed for you. Read the directions carefully, and ask your doctor or other care provider to review them with you.         Where to get your medicines      Some of these will need a paper prescription and others can be bought over the counter.  Ask your nurse if you have questions.     Bring a paper prescription for each of these medications     HYDROcodone-acetaminophen 7.5-325 MG per tablet    pregabalin 50 MG capsule                Primary Care Provider Office Phone # Fax #    Talia Bhandari [phone] [id number] [facility] [id number]        Equal Access to Services     MARY MORSE : Tona reed Sohector, waaxda luqadaha, qaybta kaalmada adevinhyaeros, shannon lloyd . So [facility] [phone].    ATENCIÓN: Si habla español, tiene a lozada disposición servicios gratuitos de asistencia lingüística. Llame al [phone].    We comply with applicable federal civil rights laws and [place] laws. We do not discriminate on the basis of race, color, national origin, age, disability sex, sexual orientation or gender identity.            Thank you!     Thank you for choosing [facility]  for your care. Our goal is always to provide you with excellent care. Hearing back from our patients is one way we can continue to improve our services. Please take a few minutes to complete the written survey that you may receive in the mail after your visit with us. Thank you!             Your Updated Medication List - Protect others around you: Learn how to safely use, store and throw away your medicines at www.disposemymeds.org.          This list is accurate as of: 9/21/17 11:52 AM.  Always use your most recent med  list.                   Brand Name Dispense Instructions for use Diagnosis    atenolol 100 MG tablet    TENORMIN    30 tablet    Take 1 tablet (100 mg) by mouth daily    Benign essential hypertension       cetirizine 10 MG tablet    zyrTEC    30 tablet    Take 1 tablet (10 mg) by mouth every evening    Allergic rhinitis due to pollen, unspecified rhinitis seasonality       fluticasone 50 MCG/ACT spray    FLONASE    1 Bottle    Spray 1-2 sprays into both nostrils daily    Allergic rhinitis due to pollen, unspecified rhinitis seasonality       * HYDROcodone-acetaminophen 7.5-325 MG per tablet    NORCO    30 tablet    Take 1-2 tablets by mouth every 4 hours as needed for moderate to severe pain    AC (acromioclavicular) joint arthritis, Long-term current use of opiate analgesic       * HYDROcodone-acetaminophen 7.5-325 MG per tablet   Start taking on:  11/20/2017    NORCO    60 tablet    Take 1 tablet by mouth every 8 hours as needed for moderate to severe pain    AC (acromioclavicular) joint arthritis, Long-term current use of opiate analgesic       IBUPROFEN PO      Take 400 mg by mouth every 6 hours as needed for moderate pain        losartan-hydrochlorothiazide 50-12.5 MG per tablet    HYZAAR    90 tablet    Take 1 tablet by mouth daily    Benign essential hypertension       NEXIUM PO      Take 20 mg by mouth daily        oxyCODONE 5 MG IR tablet    ROXICODONE    20 tablet    Take 1-2 tablets every 3-6 hours as needed for severe pain only        pregabalin 50 MG capsule    LYRICA    90 capsule    Take 1 capsule (50 mg) by mouth 3 times daily    Nerve root pain       * Notice:  This list has 2 medication(s) that are the same as other medications prescribed for you. Read the directions carefully, and ask your doctor or other care provider to review them with you.

## 2017-10-18 ENCOUNTER — OFFICE VISIT (OUTPATIENT)
Dept: OTOLARYNGOLOGY | Facility: CLINIC | Age: 52
End: 2017-10-18

## 2017-10-18 VITALS — BODY MASS INDEX: 28.19 KG/M2 | WEIGHT: 186 LBS | HEIGHT: 68 IN

## 2017-10-18 DIAGNOSIS — G89.18 ACUTE POST-OPERATIVE PAIN: Primary | ICD-10-CM

## 2017-10-18 RX ORDER — OXYCODONE AND ACETAMINOPHEN 5; 325 MG/1; MG/1
1 TABLET ORAL EVERY 6 HOURS PRN
Qty: 30 TABLET | Refills: 0 | Status: SHIPPED | OUTPATIENT
Start: 2017-10-18 | End: 2017-11-15

## 2017-10-18 ASSESSMENT — PAIN SCALES - GENERAL: PAINLEVEL: SEVERE PAIN (6)

## 2017-10-18 NOTE — PATIENT INSTRUCTIONS
Follow up is on an as needed basis. Please call our triage RN at [phone], for questions or concerns.

## 2017-10-18 NOTE — PROGRESS NOTES
HISTORY OF PRESENT ILLNESS:  Sunny Samaniego is a 52-year-old gentleman who sustained a left orbital floor fracture approximately six weeks ago and underwent open repair.  Unfortunately, postoperatively he developed a hematoma in the left cheek which has been slowly resolving.  He has had no fever or chills.  He has excellent vision with no diplopia.      PHYSICAL EXAMINATION:  Examination confirms the above.  He has normal range of vision with no diplopia in all gazes.  He has left cheek swelling.  There is no obvious erythema or infection related to it, and the suture line intraorally is intact.  He has normal range of motion of his mandible and premorbid occlusion.  He does have mild paresthesia to the left infraorbital nerve.      ASSESSMENT:  Stable status post left orbital floor repair.      PLAN:  He can use a warm cloth to the cheek to help break up the hematoma and follow up with me as needed.

## 2017-10-18 NOTE — LETTER
10/18/2017       RE: Sunny Samaniego  [address] [place] [id number]     Dear Colleague,    Thank you for referring your patient, Sunny Samaniego, to the [facility] at [facility]. Please see a copy of my visit note below.    HISTORY OF PRESENT ILLNESS:  Sunny Samaniego is a 52-year-old gentleman who sustained a left orbital floor fracture approximately six weeks ago and underwent open repair.  Unfortunately, postoperatively he developed a hematoma in the left cheek which has been slowly resolving.  He has had no fever or chills.  He has excellent vision with no diplopia.      PHYSICAL EXAMINATION:  Examination confirms the above.  He has normal range of vision with no diplopia in all gazes.  He has left cheek swelling.  There is no obvious erythema or infection related to it, and the suture line intraorally is intact.  He has normal range of motion of his mandible and premorbid occlusion.  He does have mild paresthesia to the left infraorbital nerve.      ASSESSMENT:  Stable status post left orbital floor repair.      PLAN:  He can use a warm cloth to the cheek to help break up the hematoma and follow up with me as needed.         Again, thank you for allowing me to participate in the care of your patient.      Sincerely,    Chirag Aguiar MD

## 2017-10-18 NOTE — MR AVS SNAPSHOT
After Visit Summary   10/18/2017    Sunny Samaniego    MRN: [id number]           Patient Information     Date Of Birth          1965        Visit Information        Provider Department      10/18/2017 11:30 AM Chirag Aguiar MD [facility]        Today's Diagnoses     Acute post-operative pain    -  1      Care Instructions    Follow up is on an as needed basis. Please call our triage RN at [phone], for questions or concerns.            Follow-ups after your visit        Who to contact     Please call your clinic at [phone] to:    Ask questions about your health    Make or cancel appointments    Discuss your medicines    Learn about your test results    Speak to your doctor   If you have compliments or concerns about an experience at your clinic, or if you wish to file a complaint, please contact [facility] Patient Relations at [phone] or email us at [email or web address]         Additional Information About Your Visit        [employer] Information     [employer] gives you secure access to your electronic health record. If you see a primary care provider, you can also send messages to your care team and make appointments. If you have questions, please call your primary care clinic.  If you do not have a primary care provider, please call [phone] and they will assist you.      [employer] is an electronic gateway that provides easy, online access to your medical records. With [employer], you can request a clinic appointment, read your test results, renew a prescription or communicate with your care team.     To access your existing account, please contact your [facility] or call [phone] for assistance.        Care EveryWhere ID     This is your Care EveryWhere ID. This could be used by other organizations to access your [facility] medical records  [id number]        Your Vitals Were     Height  "BMI (Body Mass Index)                1.727 m (5' 8\") 28.28 kg/m2           Blood Pressure from Last 3 Encounters:   09/21/17 130/89   09/17/17 138/88   09/07/17 (!) 167/118    Weight from Last 3 Encounters:   10/18/17 84.4 kg (186 lb)   09/21/17 82.5 kg (181 lb 12.8 oz)   09/14/17 81.9 kg (180 lb 8 oz)              Today, you had the following     No orders found for display         Today's Medication Changes          These changes are accurate as of: 10/18/17 11:59 PM.  If you have any questions, ask your nurse or doctor.               Start taking these medicines.        Dose/Directions    oxyCODONE-acetaminophen 5-325 MG per tablet   Commonly known as:  PERCOCET   Used for:  Acute post-operative pain        Dose:  1 tablet   Take 1 tablet by mouth every 6 hours as needed for pain   Quantity:  30 tablet   Refills:  0            Where to get your medicines      Some of these will need a paper prescription and others can be bought over the counter.  Ask your nurse if you have questions.     Bring a paper prescription for each of these medications     oxyCODONE-acetaminophen 5-325 MG per tablet                Primary Care Provider Office Phone # Fax #    Talia Bhandari [phone] [address] [address] [facility] [address]        Equal Access to Services     GIGI MORSE AH: Hadii carmen gunter hadasho Soomaali, waaxda luqadaha, qaybta kaalmada adeegyada, shannon lloyd . So [facility] [phone].    ATENCIÓN: Si habla español, tiene a lozada disposición servicios gratuitos de asistencia lingüística. Llame al [phone].    We comply with applicable federal civil rights laws and [place] laws. We do not discriminate on the basis of race, color, national origin, age, disability, sex, sexual orientation, or gender identity.            Thank you!     Thank you for choosing [facility]  for your care. Our goal is always to provide you with excellent care. Hearing back from " our patients is one way we can continue to improve our services. Please take a few minutes to complete the written survey that you may receive in the mail after your visit with us. Thank you!             Your Updated Medication List - Protect others around you: Learn how to safely use, store and throw away your medicines at www.disposemymeds.org.          This list is accurate as of: 10/18/17 11:59 PM.  Always use your most recent med list.                   Brand Name Dispense Instructions for use Diagnosis    atenolol 100 MG tablet    TENORMIN    30 tablet    Take 1 tablet (100 mg) by mouth daily    Benign essential hypertension       cetirizine 10 MG tablet    zyrTEC    30 tablet    Take 1 tablet (10 mg) by mouth every evening    Allergic rhinitis due to pollen, unspecified rhinitis seasonality       fluticasone 50 MCG/ACT spray    FLONASE    1 Bottle    Spray 1-2 sprays into both nostrils daily    Allergic rhinitis due to pollen, unspecified rhinitis seasonality       * HYDROcodone-acetaminophen 7.5-325 MG per tablet    NORCO    30 tablet    Take 1-2 tablets by mouth every 4 hours as needed for moderate to severe pain    AC (acromioclavicular) joint arthritis, Long-term current use of opiate analgesic       * HYDROcodone-acetaminophen 7.5-325 MG per tablet   Start taking on:  11/20/2017    NORCO    60 tablet    Take 1 tablet by mouth every 8 hours as needed for moderate to severe pain    AC (acromioclavicular) joint arthritis, Long-term current use of opiate analgesic       IBUPROFEN PO      Take 400 mg by mouth every 6 hours as needed for moderate pain        losartan-hydrochlorothiazide 50-12.5 MG per tablet    HYZAAR    90 tablet    Take 1 tablet by mouth daily    Benign essential hypertension       NEXIUM PO      Take 20 mg by mouth daily        oxyCODONE 5 MG IR tablet    ROXICODONE    20 tablet    Take 1-2 tablets every 3-6 hours as needed for severe pain only        oxyCODONE-acetaminophen 5-325 MG per  tablet    PERCOCET    30 tablet    Take 1 tablet by mouth every 6 hours as needed for pain    Acute post-operative pain       pregabalin 50 MG capsule    LYRICA    90 capsule    Take 1 capsule (50 mg) by mouth 3 times daily    Nerve root pain       * Notice:  This list has 2 medication(s) that are the same as other medications prescribed for you. Read the directions carefully, and ask your doctor or other care provider to review them with you.

## 2017-10-30 ENCOUNTER — OFFICE VISIT (OUTPATIENT)
Dept: FAMILY MEDICINE | Facility: CLINIC | Age: 52
End: 2017-10-30
Payer: COMMERCIAL

## 2017-10-30 VITALS
SYSTOLIC BLOOD PRESSURE: 144 MMHG | DIASTOLIC BLOOD PRESSURE: 86 MMHG | BODY MASS INDEX: 27.74 KG/M2 | HEIGHT: 68 IN | WEIGHT: 183 LBS

## 2017-10-30 DIAGNOSIS — S02.30XA ORBITAL FLOOR (BLOW-OUT) CLOSED FRACTURE (H): Primary | ICD-10-CM

## 2017-10-30 PROCEDURE — 99213 OFFICE O/P EST LOW 20 MIN: CPT | Performed by: PHYSICIAN ASSISTANT

## 2017-10-30 NOTE — LETTER
[facility]  [address] [facility] [address]  Phone: [phone]    October 30, 2017        Sunny Samaniego  [address] [facility] [address]          To whom it may concern:    RE: Sunny Samaniego    Patient was seen and treated today at our clinic and missed work.    Please contact me for questions or concerns.      Sincerely,        Tia Roy PA-C

## 2017-10-30 NOTE — MR AVS SNAPSHOT
"              After Visit Summary   10/30/2017    Sunny Samaniego    MRN: [id number]           Patient Information     Date Of Birth          1965        Visit Information        Provider Department      10/30/2017 5:00 PM Tia Roy PA-C [facility]         Follow-ups after your visit        Who to contact     Normal or non-critical lab and imaging results will be communicated to you by [employer], letter or phone within 4 business days after the clinic has received the results. If you do not hear from us within 7 days, please contact the clinic through [employer] or phone. If you have a critical or abnormal lab result, we will notify you by phone as soon as possible.  Submit refill requests through [employer] or call your pharmacy and they will forward the refill request to us. Please allow 3 business days for your refill to be completed.          If you need to speak with a  for additional information , please call: [phone]             Additional Information About Your Visit        [employer] Information     [employer] gives you secure access to your electronic health record. If you see a primary care provider, you can also send messages to your care team and make appointments. If you have questions, please call your primary care clinic.  If you do not have a primary care provider, please call [phone] and they will assist you.        Care EveryWhere ID     This is your Care EveryWhere ID. This could be used by other organizations to access your [place] medical records  [id number]        Your Vitals Were     Height BMI (Body Mass Index)                5' 8\" (1.727 m) 27.83 kg/m2           Blood Pressure from Last 3 Encounters:   10/30/17 144/86   09/21/17 130/89   09/17/17 138/88    Weight from Last 3 Encounters:   10/30/17 183 lb (83 kg)   10/18/17 186 lb (84.4 kg)   09/21/17 181 lb 12.8 oz (82.5 kg)              Today, you had the following     No orders found for " display       Primary Care Provider Office Phone # Fax #    Talia Bhandari [phone] [address] [address] [address]        Equal Access to Services     MARY MTZBOSTON : Hadneil carmen gunter derek Cordon, waaxda luqadaha, qaybta kaalmada jo ann, shannon arciniega lasalvadorsam ange. So [facility] [phone].    ATENCIÓN: Si habla español, tiene a lozada disposición servicios gratuitos de asistencia lingüística. Llame al [phone].    We comply with applicable federal civil rights laws and [place] laws. We do not discriminate on the basis of race, color, national origin, age, disability, sex, sexual orientation, or gender identity.            Thank you!     Thank you for choosing [facility]  for your care. Our goal is always to provide you with excellent care. Hearing back from our patients is one way we can continue to improve our services. Please take a few minutes to complete the written survey that you may receive in the mail after your visit with us. Thank you!             Your Updated Medication List - Protect others around you: Learn how to safely use, store and throw away your medicines at www.disposemymeds.org.          This list is accurate as of: 10/30/17  5:37 PM.  Always use your most recent med list.                   Brand Name Dispense Instructions for use Diagnosis    atenolol 100 MG tablet    TENORMIN    30 tablet    Take 1 tablet (100 mg) by mouth daily    Benign essential hypertension       cetirizine 10 MG tablet    zyrTEC    30 tablet    Take 1 tablet (10 mg) by mouth every evening    Allergic rhinitis due to pollen, unspecified rhinitis seasonality       fluticasone 50 MCG/ACT spray    FLONASE    1 Bottle    Spray 1-2 sprays into both nostrils daily    Allergic rhinitis due to pollen, unspecified rhinitis seasonality       * HYDROcodone-acetaminophen 7.5-325 MG per tablet    NORCO    30 tablet    Take 1-2 tablets by mouth every 4 hours as needed for  moderate to severe pain    AC (acromioclavicular) joint arthritis, Long-term current use of opiate analgesic       * HYDROcodone-acetaminophen 7.5-325 MG per tablet   Start taking on:  11/20/2017    NORCO    60 tablet    Take 1 tablet by mouth every 8 hours as needed for moderate to severe pain    AC (acromioclavicular) joint arthritis, Long-term current use of opiate analgesic       IBUPROFEN PO      Take 400 mg by mouth every 6 hours as needed for moderate pain        losartan-hydrochlorothiazide 50-12.5 MG per tablet    HYZAAR    90 tablet    Take 1 tablet by mouth daily    Benign essential hypertension       NEXIUM PO      Take 20 mg by mouth daily        oxyCODONE 5 MG IR tablet    ROXICODONE    20 tablet    Take 1-2 tablets every 3-6 hours as needed for severe pain only        oxyCODONE-acetaminophen 5-325 MG per tablet    PERCOCET    30 tablet    Take 1 tablet by mouth every 6 hours as needed for pain    Acute post-operative pain       pregabalin 50 MG capsule    LYRICA    90 capsule    Take 1 capsule (50 mg) by mouth 3 times daily    Nerve root pain       * Notice:  This list has 2 medication(s) that are the same as other medications prescribed for you. Read the directions carefully, and ask your doctor or other care provider to review them with you.

## 2017-10-30 NOTE — PROGRESS NOTES
"  SUBJECTIVE:   Sunny Samaniego is a 52 year old male who presents to clinic today for the following health issues:      Patient is here today to get his face checked out. He had facial surgery 2 months ago. He is still having drainage that looks like \"pea soup.\" He is starting to feel sick from all of the drainage. It is also very painful and swollen. Upset stomach from the drainage and headache as well.    Patient had an orbital blow out fracture on 9/6/17  Has been recovering from that  Still has some swelling on the left side of his face although tells me it is \"remarkably better\"   He developed a hematoma during recovery so was having some dried blood/drainage from his nose  That seems to have diminished but now noticing some green drainage  Worried that he might have an infection  Did have a follow up on 10/18 with ENT who felt everythign was healing well    He is wondering if an antibiotic will speed up the recovery process  Still having some numbness and pain - pain better but says when it occurs it is 'unbearable' and his pain medications don't even seem to help    Also needs a note for missing work today    Problem list and histories reviewed & adjusted, as indicated.  Additional history: as documented    Current Outpatient Prescriptions   Medication Sig Dispense Refill     oxyCODONE-acetaminophen (PERCOCET) 5-325 MG per tablet Take 1 tablet by mouth every 6 hours as needed for pain 30 tablet 0     [START ON 11/20/2017] HYDROcodone-acetaminophen (NORCO) 7.5-325 MG per tablet Take 1 tablet by mouth every 8 hours as needed for moderate to severe pain 60 tablet 0     pregabalin (LYRICA) 50 MG capsule Take 1 capsule (50 mg) by mouth 3 times daily 90 capsule 1     HYDROcodone-acetaminophen (NORCO) 7.5-325 MG per tablet Take 1-2 tablets by mouth every 4 hours as needed for moderate to severe pain 30 tablet 0     atenolol (TENORMIN) 100 MG tablet Take 1 tablet (100 mg) by mouth daily 30 tablet 1     " "losartan-hydrochlorothiazide (HYZAAR) 50-12.5 MG per tablet Take 1 tablet by mouth daily 90 tablet 3     oxyCODONE (ROXICODONE) 5 MG IR tablet Take 1-2 tablets every 3-6 hours as needed for severe pain only 20 tablet 0     IBUPROFEN PO Take 400 mg by mouth every 6 hours as needed for moderate pain       cetirizine (ZYRTEC) 10 MG tablet Take 1 tablet (10 mg) by mouth every evening 30 tablet 1     fluticasone (FLONASE) 50 MCG/ACT spray Spray 1-2 sprays into both nostrils daily 1 Bottle 11     Esomeprazole Magnesium (NEXIUM PO) Take 20 mg by mouth daily        Allergies   Allergen Reactions     Lisinopril Cough       Reviewed and updated as needed this visit by clinical staff       Reviewed and updated as needed this visit by Provider         ROS:  Remainder of ROS obtained and found to be negative other than that which was documented above      OBJECTIVE:     /86 (BP Location: Right arm, Patient Position: Chair, Cuff Size: Adult Large)  Ht 5' 8\" (1.727 m)  Wt 183 lb (83 kg)  BMI 27.83 kg/m2  Body mass index is 27.83 kg/(m^2).  GENERAL: healthy, alert and no distress  PSYCH: mentation appears normal, affect normal/bright    Diagnostic Test Results:  none     ASSESSMENT/PLAN:   (S02.30XA) Orbital floor (blow-out) closed fracture (H)  (primary encounter diagnosis)  Comment: no evidence for infection on exam today. Discussed lack of benefit from an antibiotic and possible negative effects of using one when not indicated.   Plan: No antibiotics. Seems to be healing. REviewed that it will take time as noted by ENT as well. Discussed focusing on improvement in a bigger scale vs day to day (ie he notes he is feeling much better than he was 2 weeks ago which is progress)  Note given for work which I suspect is the main reason patient coming in today        Tia Roy PA-C  [facility]    "

## 2017-11-15 ENCOUNTER — OFFICE VISIT (OUTPATIENT)
Dept: FAMILY MEDICINE | Facility: CLINIC | Age: 52
End: 2017-11-15
Payer: COMMERCIAL

## 2017-11-15 VITALS
HEIGHT: 68 IN | SYSTOLIC BLOOD PRESSURE: 154 MMHG | RESPIRATION RATE: 18 BRPM | WEIGHT: 188.5 LBS | BODY MASS INDEX: 28.57 KG/M2 | DIASTOLIC BLOOD PRESSURE: 101 MMHG | HEART RATE: 92 BPM | TEMPERATURE: 97.5 F

## 2017-11-15 DIAGNOSIS — Z12.11 SCREEN FOR COLON CANCER: ICD-10-CM

## 2017-11-15 DIAGNOSIS — S00.83XS FACIAL HEMATOMA, SEQUELA: ICD-10-CM

## 2017-11-15 DIAGNOSIS — M54.10 NERVE ROOT PAIN: ICD-10-CM

## 2017-11-15 DIAGNOSIS — E78.5 HYPERLIPIDEMIA LDL GOAL <130: ICD-10-CM

## 2017-11-15 DIAGNOSIS — I10 BENIGN ESSENTIAL HYPERTENSION: Primary | ICD-10-CM

## 2017-11-15 DIAGNOSIS — Z11.59 NEED FOR HEPATITIS C SCREENING TEST: ICD-10-CM

## 2017-11-15 LAB
ANION GAP SERPL CALCULATED.3IONS-SCNC: 7 MMOL/L (ref 3–14)
BUN SERPL-MCNC: 13 MG/DL (ref 7–30)
CALCIUM SERPL-MCNC: 9 MG/DL (ref 8.5–10.1)
CHLORIDE SERPL-SCNC: 100 MMOL/L (ref 94–109)
CHOLEST SERPL-MCNC: 163 MG/DL
CO2 SERPL-SCNC: 27 MMOL/L (ref 20–32)
CREAT SERPL-MCNC: 0.75 MG/DL (ref 0.66–1.25)
GFR SERPL CREATININE-BSD FRML MDRD: >90 ML/MIN/1.7M2
GLUCOSE SERPL-MCNC: 177 MG/DL (ref 70–99)
HDLC SERPL-MCNC: 42 MG/DL
LDLC SERPL CALC-MCNC: 52 MG/DL
NONHDLC SERPL-MCNC: 121 MG/DL
POTASSIUM SERPL-SCNC: 3.5 MMOL/L (ref 3.4–5.3)
SODIUM SERPL-SCNC: 134 MMOL/L (ref 133–144)
TRIGL SERPL-MCNC: 347 MG/DL

## 2017-11-15 PROCEDURE — 86803 HEPATITIS C AB TEST: CPT | Performed by: FAMILY MEDICINE

## 2017-11-15 PROCEDURE — 36415 COLL VENOUS BLD VENIPUNCTURE: CPT | Performed by: FAMILY MEDICINE

## 2017-11-15 PROCEDURE — 80061 LIPID PANEL: CPT | Performed by: FAMILY MEDICINE

## 2017-11-15 PROCEDURE — 80048 BASIC METABOLIC PNL TOTAL CA: CPT | Performed by: FAMILY MEDICINE

## 2017-11-15 PROCEDURE — 99213 OFFICE O/P EST LOW 20 MIN: CPT | Performed by: FAMILY MEDICINE

## 2017-11-15 RX ORDER — PREGABALIN 50 MG/1
50 CAPSULE ORAL 3 TIMES DAILY
Qty: 90 CAPSULE | Refills: 1 | Status: SHIPPED | OUTPATIENT
Start: 2017-11-15 | End: 2018-10-03

## 2017-11-15 NOTE — NURSING NOTE
"Chief Complaint   Patient presents with     Follow Up For     appointment on 10/30/2017 with Tia Roy       Initial BP (!) 154/101 (BP Location: Right arm, Patient Position: Sitting, Cuff Size: Adult Regular)  Pulse 92  Temp 97.5  F (36.4  C) (Tympanic)  Resp 18  Ht 5' 8\" (1.727 m)  Wt 188 lb 8 oz (85.5 kg)  BMI 28.66 kg/m2 Estimated body mass index is 28.66 kg/(m^2) as calculated from the following:    Height as of this encounter: 5' 8\" (1.727 m).    Weight as of this encounter: 188 lb 8 oz (85.5 kg).  Medication Reconciliation: complete   Danielle Elkins CMA    "

## 2017-11-15 NOTE — PATIENT INSTRUCTIONS
Follow up is on an as needed basis. Please call our triage RN at [phone], for questions or concerns.  Call the number above   If there is nothing else to do start the lyrica  After 1 week on the 50 mg 3 times per day then you can start to increase to 100 mg at bedtime  for a week then increase to 100 mg in the morning for a week then increae to 100 mg 3 times per day   . See how this is working it will take a few weeks to get the full effect

## 2017-11-15 NOTE — MR AVS SNAPSHOT
After Visit Summary   11/15/2017    Sunny Samaniego    MRN: [id number]           Patient Information     Date Of Birth          1965        Visit Information        Provider Department      11/15/2017 10:30 AM Talia Bhandari MD [facility]        Today's Diagnoses     Benign essential hypertension    -  1    Screen for colon cancer        Need for hepatitis C screening test        Nerve root pain        Hyperlipidemia LDL goal <130          Care Instructions    Follow up is on an as needed basis. Please call our triage RN at [phone], for questions or concerns.  Call the number above   If there is nothing else to do start the lyrica  After 1 week on the 50 mg 3 times per day then you can start to increase to 100 mg at bedtime  for a week then increase to 100 mg in the morning for a week then increae to 100 mg 3 times per day   . See how this is working it will take a few weeks to get the full effect           Follow-ups after your visit        Additional Services     GASTROENTEROLOGY ADULT REF PROCEDURE ONLY       Last Lab Result: Creatinine (mg/dL)       Date                     Value                 12/01/2016               0.91             ----------  Body mass index is 28.66 kg/(m^2).     Needed:  No  Language:  English    Patient will be contacted to schedule procedure.     Please be aware that coverage of these services is subject to the terms and limitations of your health insurance plan.  Call member services at your health plan with any benefit or coverage questions.  Any procedures must be performed at a [place] facility OR coordinated by your clinic's referral office.    Please bring the following with you to your appointment:    (1) Any X-Rays, CTs or MRIs which have been performed.  Contact the facility where they were done to arrange for  prior to your scheduled appointment.    (2) List of current medications   (3) This referral request   (4) Any  "documents/labs given to you for this referral                  Who to contact     Normal or non-critical lab and imaging results will be communicated to you by [facility], letter or phone within 4 business days after the clinic has received the results. If you do not hear from us within 7 days, please contact the clinic through [facility] or phone. If you have a critical or abnormal lab result, we will notify you by phone as soon as possible.  Submit refill requests through [employer] or call your pharmacy and they will forward the refill request to us. Please allow 3 business days for your refill to be completed.          If you need to speak with a  for additional information , please call: [phone]             Additional Information About Your Visit        [employer] Information     [employer] gives you secure access to your electronic health record. If you see a primary care provider, you can also send messages to your care team and make appointments. If you have questions, please call your primary care clinic.  If you do not have a primary care provider, please call [phone] and they will assist you.        Care EveryWhere ID     This is your Care EveryWhere ID. This could be used by other organizations to access your [facility] medical records  [id number]        Your Vitals Were     Pulse Temperature Respirations Height BMI (Body Mass Index)       92 97.5  F (36.4  C) (Tympanic) 18 5' 8\" (1.727 m) 28.66 kg/m2        Blood Pressure from Last 3 Encounters:   11/15/17 (!) 154/101   10/30/17 144/86   09/21/17 130/89    Weight from Last 3 Encounters:   11/15/17 188 lb 8 oz (85.5 kg)   10/30/17 183 lb (83 kg)   10/18/17 186 lb (84.4 kg)              We Performed the Following     Albumin Random Urine Quantitative with Creat Ratio     BASIC METABOLIC PANEL     GASTROENTEROLOGY ADULT REF PROCEDURE ONLY     Hepatitis C Screen Reflex to HCV RNA Quant and Genotype     Lipid panel reflex to direct LDL Fasting  "         Where to get your medicines      Some of these will need a paper prescription and others can be bought over the counter.  Ask your nurse if you have questions.     Bring a paper prescription for each of these medications     pregabalin 50 MG capsule          Primary Care Provider Office Phone # Fax #    Talia Bhandari [phone] [address] [facility] [address]        Equal Access to Services     [facility]: Hadii aad ku hadasho Soomaali, waaxda luqadaha, qaybta kaalmada adeegyada, waxay idiin hayaan adeeg khleannesh la'aan . So [facility] [phone].    ATENCIÓN: Si habla español, tiene a lozada disposición servicios gratuitos de asistencia lingüística. Llame al [phone].    We comply with applicable federal civil rights laws and [place] laws. We do not discriminate on the basis of race, color, national origin, age, disability, sex, sexual orientation, or gender identity.            Thank you!     Thank you for choosing [facility]  for your care. Our goal is always to provide you with excellent care. Hearing back from our patients is one way we can continue to improve our services. Please take a few minutes to complete the written survey that you may receive in the mail after your visit with us. Thank you!             Your Updated Medication List - Protect others around you: Learn how to safely use, store and throw away your medicines at www.disposemymeds.org.          This list is accurate as of: 11/15/17 10:59 AM.  Always use your most recent med list.                   Brand Name Dispense Instructions for use Diagnosis    atenolol 100 MG tablet    TENORMIN    30 tablet    Take 1 tablet (100 mg) by mouth daily    Benign essential hypertension       cetirizine 10 MG tablet    zyrTEC    30 tablet    Take 1 tablet (10 mg) by mouth every evening    Allergic rhinitis due to pollen, unspecified rhinitis seasonality       fluticasone 50 MCG/ACT spray    FLONASE    1  Bottle    Spray 1-2 sprays into both nostrils daily    Allergic rhinitis due to pollen, unspecified rhinitis seasonality       * HYDROcodone-acetaminophen 7.5-325 MG per tablet    NORCO    30 tablet    Take 1-2 tablets by mouth every 4 hours as needed for moderate to severe pain    AC (acromioclavicular) joint arthritis, Long-term current use of opiate analgesic       * HYDROcodone-acetaminophen 7.5-325 MG per tablet   Start taking on:  11/20/2017    NORCO    60 tablet    Take 1 tablet by mouth every 8 hours as needed for moderate to severe pain    AC (acromioclavicular) joint arthritis, Long-term current use of opiate analgesic       IBUPROFEN PO      Take 400 mg by mouth every 6 hours as needed for moderate pain        losartan-hydrochlorothiazide 50-12.5 MG per tablet    HYZAAR    90 tablet    Take 1 tablet by mouth daily    Benign essential hypertension       NEXIUM PO      Take 20 mg by mouth daily        oxyCODONE IR 5 MG tablet    ROXICODONE    20 tablet    Take 1-2 tablets every 3-6 hours as needed for severe pain only        oxyCODONE-acetaminophen 5-325 MG per tablet    PERCOCET    30 tablet    Take 1 tablet by mouth every 6 hours as needed for pain    Acute post-operative pain       pregabalin 50 MG capsule    LYRICA    90 capsule    Take 1 capsule (50 mg) by mouth 3 times daily    Nerve root pain       * Notice:  This list has 2 medication(s) that are the same as other medications prescribed for you. Read the directions carefully, and ask your doctor or other care provider to review them with you.

## 2017-11-15 NOTE — LETTER
[facility]  [address] [address]  [phone]        November 20, 2018    Sunny Samaniego  [address] [place] [address]              Dear Sunny Samaniego    This is to remind you that your lab is due.    You may call our office at [phone] to schedule an appointment.    Please disregard this notice if you have already had your labs drawn or made an appointment.        Sincerely,        Talia Bhandari MD

## 2017-11-15 NOTE — PROGRESS NOTES
SUBJECTIVE:                                                    Sunny Samaniego is a 52 year old male who presents to clinic today for the following health issues:    Chief Complaint   Patient presents with     Follow Up For     appointment on 10/30/2017 with Tia Roy     **He is here to have his left side of his faced rechecked. He says that it is not getting any better. Hot to the touch and hard as a rock.    **He also needs a note for work.     Problem list and histories reviewed & adjusted, as indicated.  Additional history: has pain in the left side of the face he has still the swelling in the left side of the face. He cannot take the narcotics at work. He was off work due to the pain again last night   He has been having the pain and the has been trying to put the heat on the hematoma  He has been using the saline   May have nasal d/c also feels like there is a lot of pressure   We reviewed the ENT surgeon last note.   He has not checked back in with him  He reports that the cheek will swell up at times  He has not had the retunr of sensation in the the upper part of his mouth        Patient Active Problem List   Diagnosis     Liver lesion     CARDIOVASCULAR SCREENING; LDL GOAL LESS THAN 130     HTN (hypertension)     Microscopic hematuria     Elevated fasting glucose     Anxiety state     Long-term current use of opiate analgesic     [facility]     AC (acromioclavicular) joint arthritis     Esophageal reflux     Acute upper GI bleed     Syncope     Pain in joint of right shoulder     Need for prophylactic vaccination and inoculation against influenza     Past Surgical History:   Procedure Laterality Date     CHOLECYSTECTOMY  2009     ESOPHAGOSCOPY, GASTROSCOPY, DUODENOSCOPY (EGD), COMBINED N/A 12/23/2014    Procedure: COMBINED ESOPHAGOSCOPY, GASTROSCOPY, DUODENOSCOPY (EGD), BIOPSY SINGLE OR MULTIPLE;  Surgeon: Mesfin Milian MD;  Location: [facility]     OPEN REDUCTION INTERNAL FIXATION ORBIT  "BLOWOUT Left 9/5/2017    Procedure: OPEN REDUCTION INTERNAL FIXATION ORBIT BLOWOUT;  Open Reduction Left Eye Blow Out Fracture with Hardware Placement;  Surgeon: Chirag Aguiar MD;  Location:  OR       Social History   Substance Use Topics     Smoking status: Former Smoker     Packs/day: 0.00     Years: 20.00     Types: Cigarettes     Smokeless tobacco: Never Used      Comment: smokes 1/month     Alcohol use 0.0 - 1.2 oz/week     0 - 2 Standard drinks or equivalent per week      Comment: occassional     Family History   Problem Relation Age of Onset     C.A.D. Maternal Grandfather      Prostate Cancer Paternal Grandfather      Hypertension Mother      Hypertension Brother      DIABETES No family hx of              ROS:  Constitutional, HEENT, cardiovascular, pulmonary, gi and gu systems are negative, except as otherwise noted.      OBJECTIVE:                                                    BP (!) 154/101 (BP Location: Right arm, Patient Position: Sitting, Cuff Size: Adult Regular)  Pulse 92  Temp 97.5  F (36.4  C) (Tympanic)  Resp 18  Ht 5' 8\" (1.727 m)  Wt 188 lb 8 oz (85.5 kg)  BMI 28.66 kg/m2 Body mass index is 28.66 kg/(m^2).   GENERAL APPEARANCE: healthy, alert and no distress  EYES: Eyes grossly normal to inspection, PERRL and conjunctivae and sclerae normal  HENT: left side of face still sith swelling and tender no erythema no nasal drainage   Throat wnl   No perauricular la          ASSESSMENT/PLAN:                                                      1. Facial hematoma, sequela  Cont with the warm compresses  Patient Instructions   Follow up is on an as needed basis. Please call our triage RN at [phone], for questions or concerns.  Call the number above   If there is nothing else to do start the lyrica  After 1 week on the 50 mg 3 times per day then you can start to increase to 100 mg at bedtime  for a week then increase to 100 mg in the morning for a week then increae to 100 mg 3 " times per day   . See how this is working it will take a few weeks to get the full effect         2. Nerve root pain    - pregabalin (LYRICA) 50 MG capsule; Take 1 capsule (50 mg) by mouth 3 times daily  Dispense: 90 capsule; Refill: 1    3. Benign essential hypertension  Recheck in 2 weeks needs better control   - BASIC METABOLIC PANEL  - Albumin Random Urine Quantitative with Creat Ratio; Future    4. Screen for colon cancer    - GASTROENTEROLOGY ADULT REF PROCEDURE ONLY    5. Need for hepatitis C screening test    - Hepatitis C Screen Reflex to HCV RNA Quant and Genotype    6. Hyperlipidemia LDL goal <130    - Lipid panel reflex to direct LDL Fasting     reports that he has quit smoking. His smoking use included Cigarettes. He smoked 0.00 packs per day for 20.00 years. He has never used smokeless tobacco.        Weight management plan: Discussed healthy diet and exercise guidelines and patient will follow up in 6 months in clinic to re-evaluate.    Talia Bhandari M.D.    [facility]

## 2017-11-15 NOTE — LETTER
[facility]  [address]  [facility] [address]  Phone: [phone]    November 15, 2017        Sunny Samaniego  [address] [facility] [address]          To whom it may concern:    RE: Sunny Samaniego    Patient was seen and treated today at our clinic and missed work.    Please contact me for questions or concerns.      Sincerely,        Talia Bhandari MD

## 2017-11-16 LAB — HCV AB SERPL QL IA: NONREACTIVE

## 2017-12-18 DIAGNOSIS — Z79.891 LONG-TERM CURRENT USE OF OPIATE ANALGESIC: Chronic | ICD-10-CM

## 2017-12-18 DIAGNOSIS — M19.019 AC (ACROMIOCLAVICULAR) JOINT ARTHRITIS: ICD-10-CM

## 2017-12-18 NOTE — TELEPHONE ENCOUNTER
HYDROcodone-acetaminophen (NORCO) 7.5-325 MG per tablet      Last Written Prescription Date:  11/20/17  Last Fill Quantity: 60,   # refills: 0  Last Office Visit: 11/15/17  Future Office visit:       Routing refill request to provider for review/approval because:  Drug not on the [facility], [facility] or [facility] refill protocol or controlled substance

## 2017-12-19 RX ORDER — HYDROCODONE BITARTRATE AND ACETAMINOPHEN 7.5; 325 MG/1; MG/1
1 TABLET ORAL EVERY 8 HOURS PRN
Qty: 60 TABLET | Refills: 0 | Status: SHIPPED | OUTPATIENT
Start: 2017-12-19 | End: 2018-01-17

## 2017-12-22 ENCOUNTER — TELEPHONE (OUTPATIENT)
Dept: FAMILY MEDICINE | Facility: CLINIC | Age: 52
End: 2017-12-22

## 2017-12-22 NOTE — TELEPHONE ENCOUNTER
12/22/2017      Patient is aware of preventative health screenings and will call back on their own time to schedule.          Outreach ,  Unruly Vaughn

## 2018-01-17 DIAGNOSIS — M19.019 AC (ACROMIOCLAVICULAR) JOINT ARTHRITIS: ICD-10-CM

## 2018-01-17 DIAGNOSIS — Z79.891 LONG-TERM CURRENT USE OF OPIATE ANALGESIC: Chronic | ICD-10-CM

## 2018-01-17 RX ORDER — HYDROCODONE BITARTRATE AND ACETAMINOPHEN 7.5; 325 MG/1; MG/1
1 TABLET ORAL EVERY 8 HOURS PRN
Qty: 60 TABLET | Refills: 0 | Status: SHIPPED | OUTPATIENT
Start: 2018-01-17 | End: 2018-02-16

## 2018-01-17 NOTE — TELEPHONE ENCOUNTER
Hydrocodone-acte      Last Written Prescription Date:  12/19/17  Last Fill Quantity: 60,   # refills: 0  Last Office Visit: 11/15/17  Future Office visit:       Routing refill request to provider for review/approval because:  Drug not on the [facility], [facility] or [facility] refill protocol or controlled substance

## 2018-02-16 DIAGNOSIS — Z79.891 LONG-TERM CURRENT USE OF OPIATE ANALGESIC: Chronic | ICD-10-CM

## 2018-02-16 DIAGNOSIS — M19.019 AC (ACROMIOCLAVICULAR) JOINT ARTHRITIS: ICD-10-CM

## 2018-02-16 RX ORDER — HYDROCODONE BITARTRATE AND ACETAMINOPHEN 7.5; 325 MG/1; MG/1
1 TABLET ORAL EVERY 8 HOURS PRN
Qty: 60 TABLET | Refills: 0 | Status: SHIPPED | OUTPATIENT
Start: 2018-02-16 | End: 2018-03-13

## 2018-02-16 NOTE — TELEPHONE ENCOUNTER
Darlingco      Last Written Prescription Date:  1/17/18  Last Fill Quantity: 60,   # refills: 0  Last Office Visit: 1/15/17  Future Office visit:       Routing refill request to provider for review/approval because:  Drug not on the [facility], [facility] or [facility] refill protocol or controlled substance

## 2018-03-13 DIAGNOSIS — M19.019 AC (ACROMIOCLAVICULAR) JOINT ARTHRITIS: ICD-10-CM

## 2018-03-13 DIAGNOSIS — Z79.891 LONG-TERM CURRENT USE OF OPIATE ANALGESIC: Chronic | ICD-10-CM

## 2018-03-13 RX ORDER — HYDROCODONE BITARTRATE AND ACETAMINOPHEN 7.5; 325 MG/1; MG/1
1 TABLET ORAL EVERY 8 HOURS PRN
Qty: 60 TABLET | Refills: 0 | Status: SHIPPED | OUTPATIENT
Start: 2018-03-13 | End: 2018-04-11

## 2018-03-13 NOTE — TELEPHONE ENCOUNTER
drewco      Last Written Prescription Date:  2/16/18  Last Fill Quantity: 60,   # refills: 0  Last Office Visit: 11/15/17  Future Office visit:       Routing refill request to provider for review/approval because:  Drug not on the [facility], [facility] or [facility] refill protocol or controlled substance

## 2018-04-11 DIAGNOSIS — M19.019 AC (ACROMIOCLAVICULAR) JOINT ARTHRITIS: ICD-10-CM

## 2018-04-11 DIAGNOSIS — Z79.891 LONG-TERM CURRENT USE OF OPIATE ANALGESIC: Chronic | ICD-10-CM

## 2018-04-11 RX ORDER — HYDROCODONE BITARTRATE AND ACETAMINOPHEN 7.5; 325 MG/1; MG/1
1 TABLET ORAL EVERY 8 HOURS PRN
Qty: 60 TABLET | Refills: 0 | Status: SHIPPED | OUTPATIENT
Start: 2018-04-11 | End: 2018-05-14

## 2018-04-11 NOTE — TELEPHONE ENCOUNTER
Routing refill request to provider for review/approval because:  Drug not on the [facility] refill protocol   Bonilla Brown RN

## 2018-05-14 DIAGNOSIS — M19.019 AC (ACROMIOCLAVICULAR) JOINT ARTHRITIS: ICD-10-CM

## 2018-05-14 DIAGNOSIS — Z79.891 LONG-TERM CURRENT USE OF OPIATE ANALGESIC: Chronic | ICD-10-CM

## 2018-05-14 RX ORDER — HYDROCODONE BITARTRATE AND ACETAMINOPHEN 7.5; 325 MG/1; MG/1
1 TABLET ORAL EVERY 8 HOURS PRN
Qty: 60 TABLET | Refills: 0 | Status: SHIPPED | OUTPATIENT
Start: 2018-05-14 | End: 2018-06-11

## 2018-05-14 NOTE — TELEPHONE ENCOUNTER
drewco      Last Written Prescription Date:  4/11/18  Last Fill Quantity: 60,   # refills: 0  Last Office Visit: 11/15/17  Future Office visit:       Routing refill request to provider for review/approval because:  Drug not on the [facility], [facility] or [facility] refill protocol or controlled substance

## 2018-05-15 NOTE — TELEPHONE ENCOUNTER
Tried to call Sunny, but was disconnected.  Script walked to [facility]..Kat Hicks

## 2018-05-18 ENCOUNTER — HOSPITAL ENCOUNTER (EMERGENCY)
Facility: CLINIC | Age: 53
Discharge: HOME | End: 2018-05-18
Attending: EMERGENCY MEDICINE | Admitting: EMERGENCY MEDICINE
Payer: COMMERCIAL

## 2018-05-18 VITALS
DIASTOLIC BLOOD PRESSURE: 105 MMHG | HEIGHT: 69 IN | TEMPERATURE: 97.7 F | OXYGEN SATURATION: 99 % | BODY MASS INDEX: 26.66 KG/M2 | RESPIRATION RATE: 16 BRPM | SYSTOLIC BLOOD PRESSURE: 159 MMHG | WEIGHT: 180 LBS

## 2018-05-18 DIAGNOSIS — M79.674 PAIN OF TOE OF RIGHT FOOT: ICD-10-CM

## 2018-05-18 DIAGNOSIS — S05.91XA RIGHT EYE INJURY, INITIAL ENCOUNTER: ICD-10-CM

## 2018-05-18 PROCEDURE — 99283 EMERGENCY DEPT VISIT LOW MDM: CPT

## 2018-05-18 PROCEDURE — 99283 EMERGENCY DEPT VISIT LOW MDM: CPT | Mod: Z6 | Performed by: EMERGENCY MEDICINE

## 2018-05-18 RX ORDER — TETRACAINE HYDROCHLORIDE 5 MG/ML
1-2 SOLUTION OPHTHALMIC ONCE
Status: DISCONTINUED | OUTPATIENT
Start: 2018-05-18 | End: 2018-05-18 | Stop reason: HOSPADM

## 2018-05-18 NOTE — LETTER
May 18, 2018      To Whom It May Concern:      Sunny JOSIE Samaniego was seen in our Emergency Department today, 05/18/18. He should return to work on 5/19/18.  Sincerely,        Chirag Ortiz MD

## 2018-05-18 NOTE — ED AVS SNAPSHOT
[facility] Emergency Department    [address] [facility] [address]    Phone:  [phone]    Fax:  [phone]                                       Sunny Samaniego   MRN: [id number]    Department:  [facility] Emergency Department   Date of Visit:  5/18/2018           After Visit Summary Signature Page     I have received my discharge instructions, and my questions have been answered. I have discussed any challenges I see with this plan with the nurse or doctor.    ..........................................................................................................................................  Patient/Patient Representative Signature      ..........................................................................................................................................  Patient Representative Print Name and Relationship to Patient    ..................................................               ................................................  Date                                            Time    ..........................................................................................................................................  Reviewed by Signature/Title    ...................................................              ..............................................  Date                                                            Time

## 2018-05-18 NOTE — DISCHARGE INSTRUCTIONS
Return if symptoms worsen or new symptoms develop.  Follow-up with primary care physician next available.  Drink plenty of fluids.  If any visual changes eye pain or other symptoms present please return for recheck.  He did not feel a x-ray of your toe was necessary at this time.  Buddy tape toe as needed.  Take your pain medication as directed.  Toe Sprain  A sprain is a stretching or tearing of the ligaments that hold a joint together. There are no broken bones. Sprains generally take from 3-6 weeks to heal. A toe sprain may be treated by taping the injured toe to the next toe. This is called buddy taping. This protects the injured toe and holds it in position. Mild sprains may not need any additional support. If the toenail has been hurt badly, it may fall off in 1-2 weeks. A new one will usually start to grow back within a month.     Home care    Keep your leg elevated when sitting or lying down. This is very important during the first 48 hours to reduce swelling. Stay off the injured foot as much as possible until you can walk on it without pain. If needed, you may use crutches during the first week for this purpose. Crutches can be rented at many pharmacies or surgical/orthopedic supply stores.    You may be given a cast shoe to wear to prevent movement in your toe. If not, you can use a sandal or any shoe that does not put pressure on the injured toe until the swelling and pain go away. If using a sandal, be careful not to hit your foot against anything, since another injury could make the sprain worse.    Apply an ice pack over the injured area for 15 to 20 minutes every 3 to 6 hours. You should do this for the first 24 to 48 hours. You can make an ice pack by filling a plastic bag that seals at the top with ice cubes and then wrapping it with a thin towel. Continue to use ice packs for relief of pain and swelling as needed. As the ice melts, be careful to avoid getting your wrap, splint, or cast wet. As the  ice melts, be careful to avoid getting any wrap, splint, tape, or cast wet. After 48 hours, apply heat from a warm shower or bath for 20 minutes several times daily. Alternating ice and heat may also be helpful.    If ran tape was applied and it becomes wet or dirty, change it. You may replace it with paper, plastic or cloth tape. Cloth tape and paper tapes must be kept dry. Apply gauze or cotton padding between the toes, especially near webbed area. This will help prevent the skin from getting moist and breaking down. Keep the ran tape in place for at least 4 weeks, or as advised by your healthcare provider.    You may use over-the-counter pain medicine to control pain, unless another pain medicine was prescribed. If you have chronic liver or kidney disease or ever had a stomach ulcer or GI bleeding, talk with your healthcare provider before using these medicines.    You may return to sports after healing, when you can run without pain.  Follow-up care  Follow up with your with your healthcare provider as advised. Sometimes fractures don t show up on the first X-ray. Bruises and sprains can sometimes hurt as much as a fracture. These injuries can take time to heal completely. If your symptoms don t improve or they get worse, talk with your healthcare provider. You may need a repeat X-ray. If X-rays were taken, you will be told of any new findings that may affect your care.  When to seek medical advice  Call your healthcare provider right away if any of these occur:    Redness, warmth, or fluid drainage from your toe    Pain or swelling increases    Toes become cold, blue, numb, or tingly  Date Last Reviewed: 11/20/2015 2000-2017 [employer]. [address] [place] [address]. All rights reserved. This information is not intended as a substitute for professional medical care. Always follow your healthcare professional's instructions.

## 2018-05-18 NOTE — ED NOTES
Pt here after trimming a weeping willow today and getting poked in the left eye with a branch. Pt concerned that he had a reconstructive surgery on the left eye last august and it is not fully healed. Left sclera is swollen, some redness, denies pain, just irritation. Pt also states that he believes his 5th right toe is fractured- got caught in fence when he fell down after the branch to his eye.
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
## 2018-05-19 ASSESSMENT — ENCOUNTER SYMPTOMS
CHEST TIGHTNESS: 0
TROUBLE SWALLOWING: 0
ACTIVITY CHANGE: 1
FEVER: 0
SORE THROAT: 0
EYE REDNESS: 1
SHORTNESS OF BREATH: 0
CHILLS: 0
EYE DISCHARGE: 1
CONFUSION: 0
NAUSEA: 0
NECK PAIN: 0
BACK PAIN: 0
EYE PAIN: 1
DYSURIA: 0
VOMITING: 0
ABDOMINAL PAIN: 0

## 2018-06-11 DIAGNOSIS — M19.019 AC (ACROMIOCLAVICULAR) JOINT ARTHRITIS: ICD-10-CM

## 2018-06-11 DIAGNOSIS — Z79.891 LONG-TERM CURRENT USE OF OPIATE ANALGESIC: Chronic | ICD-10-CM

## 2018-06-11 RX ORDER — HYDROCODONE BITARTRATE AND ACETAMINOPHEN 7.5; 325 MG/1; MG/1
1 TABLET ORAL EVERY 8 HOURS PRN
Qty: 60 TABLET | Refills: 0 | Status: SHIPPED | OUTPATIENT
Start: 2018-06-13 | End: 2018-07-10

## 2018-06-11 NOTE — TELEPHONE ENCOUNTER
Reason for Call:  Other prescription    Detailed comments: Patient calling requesting Ezhra rx for Norco. Please walk this over to the pharmacy when complete.    Phone Number Patient can be reached at: Home number on file [phone] (home)     Best Time: any    Can we leave a detailed message on this number? YES    Call taken on 6/11/2018 at 1:45 PM by Niru Muñiz

## 2018-06-12 NOTE — TELEPHONE ENCOUNTER
Sunny notified and script walked to [facility].  Noted that it is dated for 6/13/18.  ..Kat Hicks

## 2018-06-13 ENCOUNTER — TELEPHONE (OUTPATIENT)
Dept: FAMILY MEDICINE | Facility: CLINIC | Age: 53
End: 2018-06-13

## 2018-06-13 NOTE — TELEPHONE ENCOUNTER
Panel Management Review      Patient has the following on his problem list:     Anxiety review    PHQ-9 SCORE 3/27/2013 6/11/2014 6/15/2017   Total Score 7 9 -   Total Score - - 6     LESLIE-7 SCORE 6/11/2014 1/21/2015 6/15/2017   Total Score 9 6 -   Total Score - - 7     Patient is due for:  PHQ9 and LESLIE    Hypertension   Last three blood pressure readings:  BP Readings from Last 3 Encounters:   05/18/18 (!) 159/105   11/15/17 (!) 154/101   10/30/17 144/86     Blood pressure: FAILED    HTN Guidelines:  Age 18-59 BP range:  Less than 140/90  Age 60-85 with Diabetes:  Less than 140/90  Age 60-85 without Diabetes:  less than 150/90      Composite cancer screening  Chart review shows that this patient is due/due soon for the following Colonoscopy or Fecal Colorectal (FIT)  Summary:    Patient is due/failing the following:   BP CHECK, COLONOSCOPY, FIT and PHQ9/LESLIE    Action needed:   Patient needs office visit for BP check., Patient needs to do PHQ9. and Patient needs referral/order: FIT    Type of outreach:    Sent letter.    Questions for provider review:    None                                                                                                                                    Brissa Sabillon CMA

## 2018-06-13 NOTE — LETTER
June 13, 2018      Sunny Samaniego  [address] [place] [id number]        Dear Sunny,    In order to ensure we are providing the best quality care, Dr. Bhandari and I have reviewed your chart and see that you are due for a blood pressure recheck, Colonoscopy or FIT (fecal immunochemical testing) for colon cancer screening.     Please call [phone] or [phone] to set up a nurse only visit for blood pressure. You can also have your blood pressure checked at the [facility] that is open until 7pm Monday through Friday.     Colon cancer screening is recommended starting at the age of 50. We have noticed that you have not yet had your colonoscopy. We recognize that this procedure can be time consuming and sometimes uncomfortable. However, colonoscopy is the gold standard for colon cancer screening and may be performed as infrequently as every 10 years as long as the colon appears healthy.  Colonoscopy enables the physician to detect and remove precancerous polyps and identify early cancers during a single examination. Since colorectal cancer is the second leading cause of cancer related deaths in the [place] we strongly recommend screening for this disease.    If you are reluctant to undergo a colonoscopy, there is a less invasive and less expensive alternative. FIT (fecal immunochemical testing(take home stool sample kit)) is a colon screening option.  This is a test to check for blood in the stool, which can be performed in the comfort of your own home. When you have completed the test, you simply mail it in the pre-addressed envelope.  It does not replace the colonoscopy for colorectal cancer screening, but it can detect hidden bleeding in the lower colon.  It does need to be repeated every year and if a positive result is obtained, you would be referred for a colonoscopy.     [place] [facility] facilities:  [facility] [phone]  [facility] [phone]                FIT (fecal  immunochemical testing(take home stool sample kit)) is a colon screening option.  This is a test to check for blood in the stool, which can be performed in the comfort of your own home. When you have completed the test, you simply mail it in the pre-addressed envelope.  It does not replace the colonoscopy for colorectal cancer screening, but it can detect hidden bleeding in the lower colon.  It does need to be repeated every year and if a positive result is obtained, you would be referred for a colonoscopy. The FIT test is really easy to do and does not require any diet or medication restrictions and involves only one collection sample.  I have included a kit.     We greatly appreciate the opportunity to serve you. Thank you for trusting us with your health care.    Sincerely,    LUCIO Narvaez M.D

## 2018-07-10 DIAGNOSIS — Z79.891 LONG-TERM CURRENT USE OF OPIATE ANALGESIC: Chronic | ICD-10-CM

## 2018-07-10 DIAGNOSIS — M19.019 AC (ACROMIOCLAVICULAR) JOINT ARTHRITIS: ICD-10-CM

## 2018-07-10 RX ORDER — HYDROCODONE BITARTRATE AND ACETAMINOPHEN 7.5; 325 MG/1; MG/1
1 TABLET ORAL EVERY 8 HOURS PRN
Qty: 60 TABLET | Refills: 0 | Status: SHIPPED | OUTPATIENT
Start: 2018-07-10 | End: 2018-08-10

## 2018-07-10 NOTE — TELEPHONE ENCOUNTER
drewco      Last Written Prescription Date:  6/13/18  Last Fill Quantity: 60,   # refills: 0  Last Office Visit: 11/15/17  Future Office visit:       Routing refill request to provider for review/approval because:  Drug not on the [facility], [facility] or [facility] refill protocol or controlled substance

## 2018-08-10 DIAGNOSIS — M19.019 AC (ACROMIOCLAVICULAR) JOINT ARTHRITIS: ICD-10-CM

## 2018-08-10 DIAGNOSIS — Z79.891 LONG-TERM CURRENT USE OF OPIATE ANALGESIC: Chronic | ICD-10-CM

## 2018-08-10 RX ORDER — HYDROCODONE BITARTRATE AND ACETAMINOPHEN 7.5; 325 MG/1; MG/1
1 TABLET ORAL EVERY 8 HOURS PRN
Qty: 60 TABLET | Refills: 0 | Status: SHIPPED | OUTPATIENT
Start: 2018-08-10 | End: 2018-09-04

## 2018-08-10 NOTE — TELEPHONE ENCOUNTER
drewco      Last Written Prescription Date:  7/10/18  Last Fill Quantity: 60,   # refills: 0  Last Office Visit: 11/15/17  Future Office visit:       Routing refill request to provider for review/approval because:  Drug not on the [facility], [facility] or [facility] refill protocol or controlled substance

## 2018-09-04 DIAGNOSIS — M19.019 AC (ACROMIOCLAVICULAR) JOINT ARTHRITIS: ICD-10-CM

## 2018-09-04 DIAGNOSIS — Z79.891 LONG-TERM CURRENT USE OF OPIATE ANALGESIC: Chronic | ICD-10-CM

## 2018-09-04 NOTE — TELEPHONE ENCOUNTER
Sunny calling a head for refill.  Doesn't need until Friday so OK to date for Friday.  Not calling for a early refill, just knows that our pharmacy is not open on weekends.      jacky      Last Written Prescription Date:  8/10/18  Last Fill Quantity: 60,   # refills: 0  Last Office Visit: 11/15/17  Future Office visit:       Routing refill request to provider for review/approval because:  Drug not on the [facility], [facility] or [facility] refill protocol or controlled substance

## 2018-09-05 RX ORDER — HYDROCODONE BITARTRATE AND ACETAMINOPHEN 7.5; 325 MG/1; MG/1
1 TABLET ORAL EVERY 8 HOURS PRN
Qty: 60 TABLET | Refills: 0 | Status: SHIPPED | OUTPATIENT
Start: 2018-09-05 | End: 2018-10-03

## 2018-09-14 ENCOUNTER — TELEPHONE (OUTPATIENT)
Dept: FAMILY MEDICINE | Facility: CLINIC | Age: 53
End: 2018-09-14

## 2018-09-14 DIAGNOSIS — G89.29 CHRONIC SHOULDER PAIN, UNSPECIFIED LATERALITY: Primary | ICD-10-CM

## 2018-09-14 DIAGNOSIS — M25.519 CHRONIC SHOULDER PAIN, UNSPECIFIED LATERALITY: Primary | ICD-10-CM

## 2018-09-14 NOTE — TELEPHONE ENCOUNTER
I put in a referral for sports medicine. He has ac joint pain and other things too so that  Might be the best place to start. Talia Bhandari M.D.;

## 2018-09-14 NOTE — TELEPHONE ENCOUNTER
Reason for call:  Patient reporting a symptom    Symptom or request: Sunny Is wondering how to go about having an injection in his shoulder.  States that the pain is getting worst and he'd like to get off of the pain medication.  Does he go to Diagnoses for this?  Please review an advise. Thank you..Kat Hicks    Duration (how long have symptoms been present): on going    Have you been treated for this before? Yes    Phone Number patient can be reached at:  Home number on file [phone] (home)    Best Time:  Any time    Can we leave a detailed message on this number:  YES    Call taken on 9/14/2018 at 9:48 AM by Kat Hicks

## 2018-09-20 NOTE — TELEPHONE ENCOUNTER
Left message for patient to return call to clinic.    CSS ok to deliver message below, phone number for Orthopedic Sports Medicine: All areas ~ [phone]    Estefany GUTIERREZ Rn

## 2018-10-02 ENCOUNTER — TELEPHONE (OUTPATIENT)
Dept: LAB | Facility: CLINIC | Age: 53
End: 2018-10-02

## 2018-10-03 ENCOUNTER — OFFICE VISIT (OUTPATIENT)
Dept: FAMILY MEDICINE | Facility: CLINIC | Age: 53
End: 2018-10-03
Payer: COMMERCIAL

## 2018-10-03 VITALS
DIASTOLIC BLOOD PRESSURE: 102 MMHG | BODY MASS INDEX: 27.11 KG/M2 | TEMPERATURE: 98 F | RESPIRATION RATE: 12 BRPM | OXYGEN SATURATION: 97 % | HEIGHT: 69 IN | WEIGHT: 183 LBS | HEART RATE: 96 BPM | SYSTOLIC BLOOD PRESSURE: 146 MMHG

## 2018-10-03 DIAGNOSIS — N41.9 PROSTATITIS, UNSPECIFIED PROSTATITIS TYPE: ICD-10-CM

## 2018-10-03 DIAGNOSIS — M19.011 ARTHRITIS OF RIGHT ACROMIOCLAVICULAR JOINT: ICD-10-CM

## 2018-10-03 DIAGNOSIS — I10 BENIGN ESSENTIAL HYPERTENSION: Primary | ICD-10-CM

## 2018-10-03 DIAGNOSIS — Z79.891 LONG-TERM CURRENT USE OF OPIATE ANALGESIC: Chronic | ICD-10-CM

## 2018-10-03 PROCEDURE — 99214 OFFICE O/P EST MOD 30 MIN: CPT | Performed by: NURSE PRACTITIONER

## 2018-10-03 RX ORDER — LOSARTAN POTASSIUM AND HYDROCHLOROTHIAZIDE 12.5; 5 MG/1; MG/1
1 TABLET ORAL DAILY
Qty: 60 TABLET | Refills: 3 | Status: SHIPPED | OUTPATIENT
Start: 2018-10-03 | End: 2019-10-31

## 2018-10-03 RX ORDER — ATENOLOL 100 MG/1
100 TABLET ORAL DAILY
Qty: 30 TABLET | Refills: 1 | Status: SHIPPED | OUTPATIENT
Start: 2018-10-03 | End: 2019-10-31

## 2018-10-03 RX ORDER — HYDROCODONE BITARTRATE AND ACETAMINOPHEN 7.5; 325 MG/1; MG/1
1 TABLET ORAL EVERY 8 HOURS PRN
Qty: 60 TABLET | Refills: 0 | Status: SHIPPED | OUTPATIENT
Start: 2018-10-03 | End: 2018-11-08

## 2018-10-03 NOTE — PATIENT INSTRUCTIONS
1.  Make appointment with Dr. Bhandari in November for annual physical.  2.  Make appointment with Sports medicine for AC joint.  3.  Make appointment with Lab to collect urine.  4.  Medications refilled for 2 months.  5.  I will call you with urine results.  We can discuss antibiotic treatment at that time.

## 2018-10-03 NOTE — MR AVS SNAPSHOT
After Visit Summary   10/3/2018    Sunny Samaniego    MRN: [id number]           Patient Information     Date Of Birth          1965        Visit Information        Provider Department      10/3/2018 1:00 PM Ariane North NP [facility]        Today's Diagnoses     Benign essential hypertension        Arthritis of right acromioclavicular joint        Long-term current use of opiate analgesic          Care Instructions    1.  Make appointment with Dr. Bhandari in November for annual physical.  2.  Make appointment with Sports medicine for AC joint.  3.  Make appointment with Lab to collect urine.  4.  Medications refilled for 2 months.  5.  I will call you with urine results.  We can discuss antibiotic treatment at that time.          Follow-ups after your visit        Additional Services     SPORTS MEDICINE REFERRAL       Your provider has referred you to:  [facility]: [facility] [phone]   [email or web address]    Please be aware that coverage of these services is subject to the terms and limitations of your health insurance plan.  Call member services at your health plan with any benefit or coverage questions.      Please bring the following to your appointment:    >>   Any x-rays, CTs or MRIs which have been performed.  Contact the facility where they were done to arrange for  prior to your scheduled appointment.    >>   List of current medications   >>   This referral request   >>   Any documents/labs given to you for this referral                  Follow-up notes from your care team     Return in about 2 months (around 12/3/2018) for Physical Exam.      Who to contact     Normal or non-critical lab and imaging results will be communicated to you by MyChart, letter or phone within 4 business days after the clinic has received the results. If you do not hear from us within 7 days, please contact the clinic through [employer] or  "phone. If you have a critical or abnormal lab result, we will notify you by phone as soon as possible.  Submit refill requests through [employer] or call your pharmacy and they will forward the refill request to us. Please allow 3 business days for your refill to be completed.          If you need to speak with a  for additional information , please call: [phone]             Additional Information About Your Visit        [email or web address]" Information     [employer] gives you secure access to your electronic health record. If you see a primary care provider, you can also send messages to your care team and make appointments. If you have questions, please call your primary care clinic.  If you do not have a primary care provider, please call [phone] and they will assist you.        Care EveryWhere ID     This is your Care EveryWhere ID. This could be used by other organizations to access your [place] medical records  [id number]        Your Vitals Were     Pulse Temperature Respirations Height Pulse Oximetry BMI (Body Mass Index)    96 98  F (36.7  C) (Tympanic) 12 5' 8.75\" (1.746 m) 97% 27.22 kg/m2       Blood Pressure from Last 3 Encounters:   10/03/18 (!) 146/102   05/18/18 (!) 159/105   11/15/17 (!) 154/101    Weight from Last 3 Encounters:   10/03/18 183 lb (83 kg)   05/18/18 180 lb (81.6 kg)   11/15/17 188 lb 8 oz (85.5 kg)              We Performed the Following     SPORTS MEDICINE REFERRAL          Where to get your medicines      These medications were sent to [facility] - [address]  [address] [place] [address]     Phone:  [phone]     atenolol 100 MG tablet    losartan-hydrochlorothiazide 50-12.5 MG per tablet         Some of these will need a paper prescription and others can be bought over the counter.  Ask your nurse if you have questions.     Bring a paper prescription for each of these medications     HYDROcodone-acetaminophen 7.5-325 MG " per tablet         Information about OPIOIDS     PRESCRIPTION OPIOIDS: WHAT YOU NEED TO KNOW   We gave you an opioid (narcotic) pain medicine. It is important to manage your pain, but opioids are not always the best choice. You should first try all the other options your care team gave you. Take this medicine for as short a time (and as few doses) as possible.    Some activities can increase your pain, such as bandage changes or therapy sessions. It may help to take your pain medicine 30 to 60 minutes before these activities. Reduce your stress by getting enough sleep, working on hobbies you enjoy and practicing relaxation or meditation. Talk to your care team about ways to manage your pain beyond prescription opioids.    These medicines have risks:    DO NOT drive when on new or higher doses of pain medicine. These medicines can affect your alertness and reaction times, and you could be arrested for driving under the influence (DUI). If you need to use opioids long-term, talk to your care team about driving.    DO NOT operate heavy machinery    DO NOT do any other dangerous activities while taking these medicines.    DO NOT drink any alcohol while taking these medicines.     If the opioid prescribed includes acetaminophen, DO NOT take with any other medicines that contain acetaminophen. Read all labels carefully. Look for the word  acetaminophen  or  Tylenol.  Ask your pharmacist if you have questions or are unsure.    You can get addicted to pain medicines, especially if you have a history of addiction (chemical, alcohol or substance dependence). Talk to your care team about ways to reduce this risk.    All opioids tend to cause constipation. Drink plenty of water and eat foods that have a lot of fiber, such as fruits, vegetables, prune juice, apple juice and high-fiber cereal. Take a laxative (Miralax, milk of magnesia, Colace, Senna) if you don t move your bowels at least every other day. Other side effects  include upset stomach, sleepiness, dizziness, throwing up, tolerance (needing more of the medicine to have the same effect), physical dependence and slowed breathing.    Store your pills in a secure place, locked if possible. We will not replace any lost or stolen medicine. If you don t finish your medicine, please throw away (dispose) as directed by your pharmacist. The [place] Pollution Control [employer] has more information about safe disposal: https://www.pca.[facility].[place]/living-green/managing-unwanted-medications         Primary Care Provider Office Phone # Fax #    Talia Bhandari [phone] [address] [address]        Equal Access to Services     MARY MORSE : Tona Cordon, wasusie ko, qaluis manuel kaalmada jo ann, shannon lloyd . So [facility] [phone].    ATENCIÓN: Si habla español, tiene a lozada disposición servicios gratuitos de asistencia lingüística. Llame al [phone].    We comply with applicable federal civil rights laws and [place] laws. We do not discriminate on the basis of race, color, national origin, age, disability, sex, sexual orientation, or gender identity.            Thank you!     Thank you for choosing [facility]  for your care. Our goal is always to provide you with excellent care. Hearing back from our patients is one way we can continue to improve our services. Please take a few minutes to complete the written survey that you may receive in the mail after your visit with us. Thank you!             Your Updated Medication List - Protect others around you: Learn how to safely use, store and throw away your medicines at www.disposemymeds.org.          This list is accurate as of 10/3/18  1:36 PM.  Always use your most recent med list.                   Brand Name Dispense Instructions for use Diagnosis    atenolol 100 MG tablet    TENORMIN    30 tablet    Take 1 tablet (100 mg) by mouth  daily    Benign essential hypertension       cetirizine 10 MG tablet    zyrTEC    30 tablet    Take 1 tablet (10 mg) by mouth every evening    Allergic rhinitis due to pollen, unspecified rhinitis seasonality       HYDROcodone-acetaminophen 7.5-325 MG per tablet    NORCO    60 tablet    Take 1 tablet by mouth every 8 hours as needed for moderate to severe pain    Arthritis of right acromioclavicular joint, Long-term current use of opiate analgesic       IBUPROFEN PO      Take 400 mg by mouth every 6 hours as needed for moderate pain        losartan-hydrochlorothiazide 50-12.5 MG per tablet    HYZAAR    60 tablet    Take 1 tablet by mouth daily    Benign essential hypertension       NEXIUM PO      Take 20 mg by mouth daily

## 2018-10-03 NOTE — PROGRESS NOTES
"  SUBJECTIVE:   Sunny Samaniego is a 53 year old male who presents to clinic today for the following health issues:      Chief Complaint   Patient presents with     Referral     A referral has recently been placed for sports medicine due to AC joint pain among other issues. Dr. Bhandari stated that \"sports medicine is the best place to start.\"     Prostate Problem     Pt states that he has a prostate infection. He has had this problem in the past, and needs a refill on his antibiotics.     Refill Request     Medications pending     Patient presents today admitting that he never made appointment with sports medicine like he was suppose to.  He would like referral and number to call to make appointment now.    Patient has hx of prostatitis and full evaluation treated with cipro in the past.  Patient states that he has the low back pain.  No burning with urination, no tenderness of the prostate.  He dose state that he was wrestling with nephew recently but states that he knows that he has an infection since this is the same symptom he had before.    Patient is requesting in monthly supply of pain medication.  He knows that they are trying to wean but feels that it is helpful and nervous about doing this at this time.  He is aware he is down to 60 tabs monthly and states Dr. Bhandari will know when he needs to wean.    Hypertension Follow-up      Outpatient blood pressures are not being checked.    Per patient BP is always high in clinic.    Per patient he is not good at taking the medications daily like he should    Low Salt Diet: not monitoring salt      Amount of exercise or physical activity: 2-3 days/week for an average of 15-30 minutes    Problems taking medications regularly: No    Medication side effects: none    Diet: regular (no restrictions)    Problem list and histories reviewed & adjusted, as indicated.  Additional history: as documented    Patient Active Problem List   Diagnosis     Liver lesion     " CARDIOVASCULAR SCREENING; LDL GOAL LESS THAN 130     HTN (hypertension)     Microscopic hematuria     Elevated fasting glucose     Anxiety state     Long-term current use of opiate analgesic     [facility]     AC (acromioclavicular) joint arthritis     Esophageal reflux     Acute upper GI bleed     Syncope     Pain in joint of right shoulder     Need for prophylactic vaccination and inoculation against influenza     Past Surgical History:   Procedure Laterality Date     CHOLECYSTECTOMY  2009     ESOPHAGOSCOPY, GASTROSCOPY, DUODENOSCOPY (EGD), COMBINED N/A 12/23/2014    Procedure: COMBINED ESOPHAGOSCOPY, GASTROSCOPY, DUODENOSCOPY (EGD), BIOPSY SINGLE OR MULTIPLE;  Surgeon: Mesfin Milian MD;  Location: [facility]     OPEN REDUCTION INTERNAL FIXATION ORBIT BLOWOUT Left 9/5/2017    Procedure: OPEN REDUCTION INTERNAL FIXATION ORBIT BLOWOUT;  Open Reduction Left Eye Blow Out Fracture with Hardware Placement;  Surgeon: Chirag Aguiar MD;  Location:  OR       Social History   Substance Use Topics     Smoking status: Former Smoker     Packs/day: 0.00     Years: 20.00     Types: Cigarettes     Smokeless tobacco: Never Used      Comment: smokes 1/month     Alcohol use 0.0 - 1.2 oz/week     0 - 2 Standard drinks or equivalent per week      Comment: occassional     Family History   Problem Relation Age of Onset     C.A.D. Maternal Grandfather      Prostate Cancer Paternal Grandfather      Hypertension Mother      Hypertension Brother      Diabetes No family hx of          Current Outpatient Prescriptions   Medication Sig Dispense Refill     atenolol (TENORMIN) 100 MG tablet Take 1 tablet (100 mg) by mouth daily 30 tablet 1     cetirizine (ZYRTEC) 10 MG tablet Take 1 tablet (10 mg) by mouth every evening 30 tablet 1     Esomeprazole Magnesium (NEXIUM PO) Take 20 mg by mouth daily        HYDROcodone-acetaminophen (NORCO) 7.5-325 MG per tablet Take 1 tablet by mouth every 8 hours as needed for moderate to severe pain  "60 tablet 0     IBUPROFEN PO Take 400 mg by mouth every 6 hours as needed for moderate pain       losartan-hydrochlorothiazide (HYZAAR) 50-12.5 MG per tablet Take 1 tablet by mouth daily 60 tablet 3     [DISCONTINUED] atenolol (TENORMIN) 100 MG tablet Take 1 tablet (100 mg) by mouth daily 30 tablet 1     [DISCONTINUED] losartan-hydrochlorothiazide (HYZAAR) 50-12.5 MG per tablet Take 1 tablet by mouth daily 90 tablet 3     Allergies   Allergen Reactions     Lisinopril Cough       Reviewed and updated as needed this visit by clinical staff  Tobacco  Allergies  Meds  Problems       Reviewed and updated as needed this visit by Provider  Allergies  Meds  Problems         ROS:  CONSTITUTIONAL: NEGATIVE for fever, chills, change in weight  ENT/MOUTH: NEGATIVE for ear, mouth and throat problems  RESP: NEGATIVE for significant cough or SOB  CV: NEGATIVE for chest pain, palpitations or peripheral edema  GI: NEGATIVE for nausea, abdominal pain, heartburn, or change in bowel habits  MUSCULOSKELETAL: POSITIVE  for right AC joint pain and lower back pain  PSYCHIATRIC: NEGATIVE for changes in mood or affect  ROS otherwise negative    OBJECTIVE:     BP (!) 146/102  Pulse 96  Temp 98  F (36.7  C) (Tympanic)  Resp 12  Ht 5' 8.75\" (1.746 m)  Wt 183 lb (83 kg)  SpO2 97%  BMI 27.22 kg/m2  Body mass index is 27.22 kg/(m^2).  GENERAL: healthy, alert and no distress  RESP: lungs clear to auscultation - no rales, rhonchi or wheezes  CV: regular rate and rhythm, normal S1 S2, no S3 or S4, no murmur, click or rub, no peripheral edema and peripheral pulses strong  ABDOMEN: soft, nontender, no hepatosplenomegaly, no masses and bowel sounds normal  MS: no gross musculoskeletal defects noted, no edema  NEURO: Normal strength and tone, mentation intact and speech normal  Comprehensive back pain exam:  Tenderness of lower back with no radiculopathy, Pain limits the following motions: lateral bending and flexion and extension of the " lower back, Lower extremity strength functional and equal on both sides, Lower extremity reflexes within normal limits bilaterally, Lower extremity sensation normal and equal on both sides and Straight leg raise negative bilaterally    Diagnostic Test Results:  none     ASSESSMENT/PLAN:     1. Benign essential hypertension  Patient's last BMP was showed normal K+, Na+, and kidney function on 11/17.  Patient was refilled for 2 months with his BP medications and recommended to make an effort to take them daily.  He was advised to make an annual physical exam with Dr. Bhandari in 2 months for follow-up.    - atenolol (TENORMIN) 100 MG tablet; Take 1 tablet (100 mg) by mouth daily  Dispense: 30 tablet; Refill: 1  - losartan-hydrochlorothiazide (HYZAAR) 50-12.5 MG per tablet; Take 1 tablet by mouth daily  Dispense: 60 tablet; Refill: 3    2. Arthritis of right acromioclavicular joint  Sports medicine referral placed with phone number given to patient to make appointment.  - SPORTS MEDICINE REFERRAL    3. Long-term current use of opiate analgesic  Norco is beneficial for patients pain control currently.  Refilled one month per contract.  [place] PDMP was reviewed and his last refill was 1 month ago.  - HYDROcodone-acetaminophen (NORCO) 7.5-325 MG per tablet; Take 1 tablet by mouth every 8 hours as needed for moderate to severe pain  Dispense: 60 tablet; Refill: 0    4. Prostatitis, unspecified prostatitis type  Symptoms are vague and there is no prostate pain just lower back pain.  UA ordered for evaluation but patient urinated prior to coming in so he will make a future appointment to get this done tomorrow.  I will plan to call him with results.  - **UA reflex to Microscopic FUTURE anytime; Future    See Patient Instructions    Ariane North NP  [facility]

## 2018-10-04 NOTE — TELEPHONE ENCOUNTER
LEFT MESSAGE on personal phone and phone number given to schedule if he hasn't already done so.  Thank you..Kat Hicks

## 2018-11-08 DIAGNOSIS — M19.011 ARTHRITIS OF RIGHT ACROMIOCLAVICULAR JOINT: ICD-10-CM

## 2018-11-08 DIAGNOSIS — Z79.891 LONG-TERM CURRENT USE OF OPIATE ANALGESIC: Chronic | ICD-10-CM

## 2018-11-08 NOTE — TELEPHONE ENCOUNTER
Darlingco      Last Written Prescription Date:  10/3/18  Last Fill Quantity: 60,   # refills: 0  Last Office Visit: 10/3/18  Future Office visit:       Routing refill request to provider for review/approval because:  Drug not on the [facility], [facility] or [facility] refill protocol or controlled substance

## 2018-11-09 RX ORDER — HYDROCODONE BITARTRATE AND ACETAMINOPHEN 7.5; 325 MG/1; MG/1
1 TABLET ORAL EVERY 8 HOURS PRN
Qty: 60 TABLET | Refills: 0 | Status: SHIPPED | OUTPATIENT
Start: 2018-11-09 | End: 2018-12-05

## 2018-11-09 NOTE — TELEPHONE ENCOUNTER
Dr. Bhandari not here to fill.  Sunny asked that I send to you.  Please review and fill if appropriate.  Thank you..Kat Hicks

## 2018-12-05 DIAGNOSIS — M19.011 ARTHRITIS OF RIGHT ACROMIOCLAVICULAR JOINT: ICD-10-CM

## 2018-12-05 DIAGNOSIS — Z79.891 LONG-TERM CURRENT USE OF OPIATE ANALGESIC: Chronic | ICD-10-CM

## 2018-12-05 RX ORDER — HYDROCODONE BITARTRATE AND ACETAMINOPHEN 7.5; 325 MG/1; MG/1
1 TABLET ORAL EVERY 8 HOURS PRN
Qty: 60 TABLET | Refills: 0 | Status: SHIPPED | OUTPATIENT
Start: 2018-12-05 | End: 2019-01-07

## 2018-12-05 NOTE — TELEPHONE ENCOUNTER
Sunny was hoping to  script on 12/7/18 as it's due for refill on Sunday 12/9/18 and of course pharmacy not open on that day.  Please review and fill if appropriate.    Norco      Last Written Prescription Date:  11/9/18  Last Fill Quantity: 60,   # refills: 1  Last Office Visit: 10/3/18  Future Office visit:       Routing refill request to provider for review/approval because:  Drug not on the G, P or [facility] refill protocol or controlled substance

## 2018-12-27 ENCOUNTER — TELEPHONE (OUTPATIENT)
Dept: LAB | Facility: CLINIC | Age: 53
End: 2018-12-27

## 2019-01-07 DIAGNOSIS — M19.011 ARTHRITIS OF RIGHT ACROMIOCLAVICULAR JOINT: ICD-10-CM

## 2019-01-07 DIAGNOSIS — Z79.891 LONG-TERM CURRENT USE OF OPIATE ANALGESIC: Chronic | ICD-10-CM

## 2019-01-07 RX ORDER — HYDROCODONE BITARTRATE AND ACETAMINOPHEN 7.5; 325 MG/1; MG/1
1 TABLET ORAL EVERY 8 HOURS PRN
Qty: 60 TABLET | Refills: 0 | Status: SHIPPED | OUTPATIENT
Start: 2019-01-07 | End: 2019-02-04

## 2019-01-07 NOTE — TELEPHONE ENCOUNTER
Darlingco      Last Written Prescription Date:  12/5/18  Last Fill Quantity: 60,   # refills: 0  Last Office Visit: 11/7/18  Future Office visit:       Routing refill request to provider for review/approval because:  Drug not on the [facility], [facility] or [facility] refill protocol or controlled substance

## 2019-02-04 DIAGNOSIS — M19.011 ARTHRITIS OF RIGHT ACROMIOCLAVICULAR JOINT: ICD-10-CM

## 2019-02-04 DIAGNOSIS — Z79.891 LONG-TERM CURRENT USE OF OPIATE ANALGESIC: Chronic | ICD-10-CM

## 2019-02-04 RX ORDER — HYDROCODONE BITARTRATE AND ACETAMINOPHEN 7.5; 325 MG/1; MG/1
1 TABLET ORAL EVERY 8 HOURS PRN
Qty: 60 TABLET | Refills: 0 | Status: SHIPPED | OUTPATIENT
Start: 2019-02-07 | End: 2019-03-05

## 2019-02-04 NOTE — TELEPHONE ENCOUNTER
Pt called and is needing a refill. Pt stated if Dr Bhandari is not available then Tia can refill his meds. Please advise. Pt stated he would like to use our pharmacy    Madie Estes, [place].

## 2019-02-05 NOTE — TELEPHONE ENCOUNTER
Script has been walked over to the [facility].  LEFT MESSAGE for Jamila Hicks

## 2019-03-05 DIAGNOSIS — Z79.891 LONG-TERM CURRENT USE OF OPIATE ANALGESIC: Chronic | ICD-10-CM

## 2019-03-05 DIAGNOSIS — M19.011 ARTHRITIS OF RIGHT ACROMIOCLAVICULAR JOINT: ICD-10-CM

## 2019-03-05 RX ORDER — HYDROCODONE BITARTRATE AND ACETAMINOPHEN 7.5; 325 MG/1; MG/1
1 TABLET ORAL EVERY 8 HOURS PRN
Qty: 60 TABLET | Refills: 0 | Status: SHIPPED | OUTPATIENT
Start: 2019-03-05 | End: 2019-04-03

## 2019-03-05 NOTE — TELEPHONE ENCOUNTER
Darlingco      Last Written Prescription Date:  2/7/19  Last Fill Quantity: 60,   # refills: 0  Last Office Visit: 11/7/18  Future Office visit:       Routing refill request to provider for review/approval because:  Drug not on the [facility], P or [facility] refill protocol or controlled substance

## 2019-04-03 DIAGNOSIS — M19.011 ARTHRITIS OF RIGHT ACROMIOCLAVICULAR JOINT: ICD-10-CM

## 2019-04-03 DIAGNOSIS — Z79.891 LONG-TERM CURRENT USE OF OPIATE ANALGESIC: Chronic | ICD-10-CM

## 2019-04-03 RX ORDER — HYDROCODONE BITARTRATE AND ACETAMINOPHEN 7.5; 325 MG/1; MG/1
1 TABLET ORAL EVERY 8 HOURS PRN
Qty: 60 TABLET | Refills: 0 | Status: SHIPPED | OUTPATIENT
Start: 2019-04-05 | End: 2019-05-06

## 2019-04-03 NOTE — TELEPHONE ENCOUNTER
norco  -      Due on Saturday but pharmacy closed on Saturday.      Last Written Prescription Date:  3/5/19  Last Fill Quantity: 60,   # refills: 0  Last Office Visit: 10/3/18  Future Office visit:       Routing refill request to provider for review/approval because:  Drug not on the [facility], [facility] or [facility] refill protocol or controlled substance

## 2019-05-06 DIAGNOSIS — Z79.891 LONG-TERM CURRENT USE OF OPIATE ANALGESIC: Chronic | ICD-10-CM

## 2019-05-06 DIAGNOSIS — M19.011 ARTHRITIS OF RIGHT ACROMIOCLAVICULAR JOINT: ICD-10-CM

## 2019-05-06 RX ORDER — HYDROCODONE BITARTRATE AND ACETAMINOPHEN 7.5; 325 MG/1; MG/1
1 TABLET ORAL EVERY 8 HOURS PRN
Qty: 60 TABLET | Refills: 0 | Status: SHIPPED | OUTPATIENT
Start: 2019-05-06 | End: 2019-06-04

## 2019-06-03 DIAGNOSIS — Z79.891 LONG-TERM CURRENT USE OF OPIATE ANALGESIC: Chronic | ICD-10-CM

## 2019-06-03 DIAGNOSIS — M19.011 ARTHRITIS OF RIGHT ACROMIOCLAVICULAR JOINT: ICD-10-CM

## 2019-06-04 ENCOUNTER — TELEPHONE (OUTPATIENT)
Dept: FAMILY MEDICINE | Facility: CLINIC | Age: 54
End: 2019-06-04

## 2019-06-04 RX ORDER — HYDROCODONE BITARTRATE AND ACETAMINOPHEN 7.5; 325 MG/1; MG/1
1 TABLET ORAL EVERY 8 HOURS PRN
Qty: 60 TABLET | Refills: 0 | Status: SHIPPED | OUTPATIENT
Start: 2019-06-04 | End: 2019-07-02

## 2019-06-04 NOTE — TELEPHONE ENCOUNTER
Sunny is requesting that Simran does not order his medications or is she allowed to  his medications.      6/4/19.  Thank you..Kat Hicks

## 2019-07-01 DIAGNOSIS — Z79.891 LONG-TERM CURRENT USE OF OPIATE ANALGESIC: Chronic | ICD-10-CM

## 2019-07-01 DIAGNOSIS — M19.011 ARTHRITIS OF RIGHT ACROMIOCLAVICULAR JOINT: ICD-10-CM

## 2019-07-01 NOTE — TELEPHONE ENCOUNTER
Darlingco      Last Written Prescription Date:  6/4/19  Last Fill Quantity: 60,   # refills: 0  Last Office Visit: 11/7/18  Future Office visit:       Routing refill request to provider for review/approval because:  Drug not on the [facility], P or [facility] refill protocol or controlled substance

## 2019-07-02 RX ORDER — HYDROCODONE BITARTRATE AND ACETAMINOPHEN 7.5; 325 MG/1; MG/1
1 TABLET ORAL EVERY 8 HOURS PRN
Qty: 60 TABLET | Refills: 0 | Status: SHIPPED | OUTPATIENT
Start: 2019-07-02 | End: 2019-08-05

## 2019-08-01 DIAGNOSIS — M19.011 ARTHRITIS OF RIGHT ACROMIOCLAVICULAR JOINT: ICD-10-CM

## 2019-08-01 DIAGNOSIS — Z79.891 LONG-TERM CURRENT USE OF OPIATE ANALGESIC: Chronic | ICD-10-CM

## 2019-08-05 RX ORDER — HYDROCODONE BITARTRATE AND ACETAMINOPHEN 7.5; 325 MG/1; MG/1
1 TABLET ORAL EVERY 8 HOURS PRN
Qty: 60 TABLET | Refills: 0 | Status: SHIPPED | OUTPATIENT
Start: 2019-08-05 | End: 2019-09-04

## 2019-08-05 NOTE — TELEPHONE ENCOUNTER
Patient notified and script walked to [place] pharmacy.     Juliana Soriano, [facility] [place]

## 2019-09-03 DIAGNOSIS — Z79.891 LONG-TERM CURRENT USE OF OPIATE ANALGESIC: Chronic | ICD-10-CM

## 2019-09-03 DIAGNOSIS — M19.011 ARTHRITIS OF RIGHT ACROMIOCLAVICULAR JOINT: ICD-10-CM

## 2019-09-03 NOTE — TELEPHONE ENCOUNTER
Darlingco      Last Written Prescription Date:  8/5/19  Last Fill Quantity: 60,   # refills: 0  Last Office Visit: 11/7/18  Future Office visit:       Routing refill request to provider for review/approval because:  Drug not on the [facility], P or [facility] refill protocol or controlled substance

## 2019-09-04 RX ORDER — HYDROCODONE BITARTRATE AND ACETAMINOPHEN 7.5; 325 MG/1; MG/1
1 TABLET ORAL EVERY 8 HOURS PRN
Qty: 59 TABLET | Refills: 0 | Status: SHIPPED | OUTPATIENT
Start: 2019-09-04 | End: 2019-10-01

## 2019-09-12 ENCOUNTER — TELEPHONE (OUTPATIENT)
Dept: FAMILY MEDICINE | Facility: CLINIC | Age: 54
End: 2019-09-12

## 2019-09-12 NOTE — LETTER
September 12, 2019      Sunny Samaniego  [address] [place] [address]        Dear Bill,    In order to ensure we are providing the best quality care, Dr. Bhandari and I have reviewed your chart and see that you are due for a medication check.     Please call the clinic to set up an office visit at [phone] or [phone].    We greatly appreciate the opportunity to serve you. Thank you for trusting us with your health care.    Sincerely,    Talia Bhandari M.D.  Brisas ROWLAND, CMA

## 2019-09-12 NOTE — TELEPHONE ENCOUNTER
Panel Management Review      Patient has the following on his problem list:     Depression / Dysthymia review    Measure:  Needs PHQ-9 score of 4 or less during index window.  Administer PHQ-9 and if score is 5 or more, send encounter to provider for next steps.    5 - 7 month window range:      PHQ-9 SCORE 3/27/2013 6/11/2014 6/15/2017   PHQ-9 Total Score 7 9 -   PHQ-9 Total Score - - 6       If PHQ-9 recheck is 5 or more, route to provider for next steps.    Patient is due for:  PHQ9 and LESLIE    Hypertension   Last three blood pressure readings:  BP Readings from Last 3 Encounters:   10/03/18 (!) 146/102   05/18/18 (!) 159/105   11/15/17 (!) 154/101     Blood pressure: FAILED    HTN Guidelines:  Less than 140/90      Composite cancer screening  Chart review shows that this patient is due/due soon for the following Colonoscopy  Summary:    Patient is due/failing the following:   BP CHECK, COLONOSCOPY and FOLLOW UP    Action needed:   Patient needs office visit for Medication recheck., Patient needs to do PHQ9. and Patient needs referral/order: colonoscopy    Type of outreach:    Sent letter.    Questions for provider review:    None                                                                                                                                    Brissa Sabillon CMA     Chart routed to self .

## 2019-10-01 DIAGNOSIS — M19.011 ARTHRITIS OF RIGHT ACROMIOCLAVICULAR JOINT: ICD-10-CM

## 2019-10-01 DIAGNOSIS — Z79.891 LONG-TERM CURRENT USE OF OPIATE ANALGESIC: Chronic | ICD-10-CM

## 2019-10-01 RX ORDER — HYDROCODONE BITARTRATE AND ACETAMINOPHEN 7.5; 325 MG/1; MG/1
1 TABLET ORAL EVERY 8 HOURS PRN
Qty: 59 TABLET | Refills: 0 | Status: SHIPPED | OUTPATIENT
Start: 2019-10-01 | End: 2019-10-31

## 2019-10-01 NOTE — LETTER
[facility]  [address]  [facility] [address]  Phone: [phone]        October 11, 2019      Sunny Samaniego                                                                                                             [address] [place] [id number]            Dear Sunny,     We are concerned about your health care.  We recently provided you with a medication refill.  Many medications require routine follow-up with your Doctor.      At this time we ask that: You schedule an appointment for review of your medications.    Please call [phone] to schedule your appointment.        Thank you,      Dr. Talia Bhandari / sheron

## 2019-10-01 NOTE — TELEPHONE ENCOUNTER
Requested Prescriptions   Pending Prescriptions Disp Refills     HYDROcodone-acetaminophen (NORCO) 7.5-325 MG per tablet 59 tablet 0     Sig: Take 1 tablet by mouth every 8 hours as needed for moderate to severe pain       There is no refill protocol information for this order        Last Written Prescription Date:  09/05/19  Last Fill Quantity: 59,  # refills: 0   Last office visit: 10/3/2018 with prescribing provider:  [place]  Future Office Visit:

## 2019-10-01 NOTE — TELEPHONE ENCOUNTER
Please call Sunny. He has not been seen in 1 year. With his continued use of opioids, he needs to be seen for further refills. I will refill this month but he needs a check for this and his blood pressure etc. And labs. Talia Bhandari M.D.    I have checked  and there have been no prescriptions except from this office. Talia Bhandari M.D.'

## 2019-10-30 ENCOUNTER — OFFICE VISIT (OUTPATIENT)
Dept: FAMILY MEDICINE | Facility: CLINIC | Age: 54
End: 2019-10-30
Payer: COMMERCIAL

## 2019-10-30 DIAGNOSIS — Z53.9 NO SHOW: Primary | ICD-10-CM

## 2019-10-31 ENCOUNTER — OFFICE VISIT (OUTPATIENT)
Dept: FAMILY MEDICINE | Facility: CLINIC | Age: 54
End: 2019-10-31
Payer: COMMERCIAL

## 2019-10-31 VITALS
TEMPERATURE: 98.1 F | SYSTOLIC BLOOD PRESSURE: 144 MMHG | BODY MASS INDEX: 26.24 KG/M2 | WEIGHT: 176.4 LBS | DIASTOLIC BLOOD PRESSURE: 99 MMHG | HEART RATE: 102 BPM | OXYGEN SATURATION: 99 %

## 2019-10-31 DIAGNOSIS — Z11.4 SCREENING FOR HIV (HUMAN IMMUNODEFICIENCY VIRUS): ICD-10-CM

## 2019-10-31 DIAGNOSIS — R73.01 ELEVATED FASTING GLUCOSE: ICD-10-CM

## 2019-10-31 DIAGNOSIS — Z13.220 SCREENING FOR LIPOID DISORDERS: ICD-10-CM

## 2019-10-31 DIAGNOSIS — S02.32XS CLOSED FRACTURE OF LEFT ORBITAL FLOOR, SEQUELA (H): ICD-10-CM

## 2019-10-31 DIAGNOSIS — I10 ESSENTIAL HYPERTENSION: Chronic | ICD-10-CM

## 2019-10-31 DIAGNOSIS — M19.011 ARTHRITIS OF RIGHT ACROMIOCLAVICULAR JOINT: ICD-10-CM

## 2019-10-31 DIAGNOSIS — I10 BENIGN ESSENTIAL HYPERTENSION: ICD-10-CM

## 2019-10-31 DIAGNOSIS — F41.1 ANXIETY STATE: Chronic | ICD-10-CM

## 2019-10-31 DIAGNOSIS — Z23 NEED FOR PROPHYLACTIC VACCINATION AND INOCULATION AGAINST INFLUENZA: ICD-10-CM

## 2019-10-31 DIAGNOSIS — Z12.11 SPECIAL SCREENING FOR MALIGNANT NEOPLASMS, COLON: ICD-10-CM

## 2019-10-31 DIAGNOSIS — Z79.891 LONG-TERM CURRENT USE OF OPIATE ANALGESIC: Primary | Chronic | ICD-10-CM

## 2019-10-31 DIAGNOSIS — R31.29 MICROSCOPIC HEMATURIA: ICD-10-CM

## 2019-10-31 LAB
ALBUMIN SERPL-MCNC: 3.8 G/DL (ref 3.4–5)
ALP SERPL-CCNC: 98 U/L (ref 40–150)
ALT SERPL W P-5'-P-CCNC: 30 U/L (ref 0–70)
ANION GAP SERPL CALCULATED.3IONS-SCNC: 4 MMOL/L (ref 3–14)
AST SERPL W P-5'-P-CCNC: 21 U/L (ref 0–45)
BILIRUB SERPL-MCNC: 0.4 MG/DL (ref 0.2–1.3)
BUN SERPL-MCNC: 13 MG/DL (ref 7–30)
CALCIUM SERPL-MCNC: 9 MG/DL (ref 8.5–10.1)
CHLORIDE SERPL-SCNC: 107 MMOL/L (ref 94–109)
CO2 SERPL-SCNC: 25 MMOL/L (ref 20–32)
CREAT SERPL-MCNC: 0.83 MG/DL (ref 0.66–1.25)
GFR SERPL CREATININE-BSD FRML MDRD: >90 ML/MIN/{1.73_M2}
GLUCOSE SERPL-MCNC: 105 MG/DL (ref 70–99)
HBA1C MFR BLD: 5.6 % (ref 0–5.6)
POTASSIUM SERPL-SCNC: 3.5 MMOL/L (ref 3.4–5.3)
PROT SERPL-MCNC: 7.8 G/DL (ref 6.8–8.8)
SODIUM SERPL-SCNC: 136 MMOL/L (ref 133–144)

## 2019-10-31 PROCEDURE — 87389 HIV-1 AG W/HIV-1&-2 AB AG IA: CPT | Performed by: PHYSICIAN ASSISTANT

## 2019-10-31 PROCEDURE — 99214 OFFICE O/P EST MOD 30 MIN: CPT | Mod: 25 | Performed by: PHYSICIAN ASSISTANT

## 2019-10-31 PROCEDURE — 80053 COMPREHEN METABOLIC PANEL: CPT | Performed by: PHYSICIAN ASSISTANT

## 2019-10-31 PROCEDURE — 90686 IIV4 VACC NO PRSV 0.5 ML IM: CPT | Performed by: PHYSICIAN ASSISTANT

## 2019-10-31 PROCEDURE — 83036 HEMOGLOBIN GLYCOSYLATED A1C: CPT | Performed by: PHYSICIAN ASSISTANT

## 2019-10-31 PROCEDURE — 36415 COLL VENOUS BLD VENIPUNCTURE: CPT | Performed by: PHYSICIAN ASSISTANT

## 2019-10-31 PROCEDURE — 90471 IMMUNIZATION ADMIN: CPT | Performed by: PHYSICIAN ASSISTANT

## 2019-10-31 RX ORDER — ATENOLOL 100 MG/1
100 TABLET ORAL DAILY
Qty: 90 TABLET | Refills: 1 | Status: SHIPPED | OUTPATIENT
Start: 2019-10-31 | End: 2020-04-29

## 2019-10-31 RX ORDER — HYDROCODONE BITARTRATE AND ACETAMINOPHEN 7.5; 325 MG/1; MG/1
1 TABLET ORAL EVERY 8 HOURS PRN
Qty: 59 TABLET | Refills: 0 | Status: SHIPPED | OUTPATIENT
Start: 2019-10-31 | End: 2019-12-02

## 2019-10-31 RX ORDER — LOSARTAN POTASSIUM AND HYDROCHLOROTHIAZIDE 12.5; 5 MG/1; MG/1
1 TABLET ORAL DAILY
Qty: 90 TABLET | Refills: 1 | Status: SHIPPED | OUTPATIENT
Start: 2019-10-31 | End: 2020-06-02

## 2019-10-31 NOTE — PATIENT INSTRUCTIONS
Restart blood pressure medications  Recheck blood pressure in 2 weeks - nurse visit or pharmacy visit  Recheck fasting labwork in 1 month     Follow up with Dr. Aguiar ENT  Follow up with orthopedic surgeon for consult    Follow up with Dr. Bhandari in 3 months  We filled your monthly prescription for norco 59/month. Next month we can start #58 tablets/month for the next few months and continue the tapering process

## 2019-10-31 NOTE — LETTER
November 13, 2019      Sunny Samaniego  [address] [place] [id number]        Dear ,    We are writing to inform you of your test results.    Your labs all look okay/normal   Your comprehensive panel (electrolytes, kidney function, liver function tests) is normal   Your A1c (3 month average of sugars) was normal - no diabetes     If you have any questions or concerns, please call the clinic at the number listed above.     Sincerely,    Tia Roy PA-C/sc                                                  Resulted Orders   HIV Antigen Antibody Combo   Result Value Ref Range    HIV Antigen Antibody Combo Nonreactive NR^Nonreactive       Comprehensive metabolic panel   Result Value Ref Range    Sodium 136 133 - 144 mmol/L    Potassium 3.5 3.4 - 5.3 mmol/L    Chloride 107 94 - 109 mmol/L    Carbon Dioxide 25 20 - 32 mmol/L    Anion Gap 4 3 - 14 mmol/L    Glucose 105 (H) 70 - 99 mg/dL    Urea Nitrogen 13 7 - 30 mg/dL    Creatinine 0.83 0.66 - 1.25 mg/dL    GFR Estimate >90 >60 mL/min/[1.73_m2]    GFR Estimate If Black >90 >60 mL/min/[1.73_m2]    Calcium 9.0 8.5 - 10.1 mg/dL    Bilirubin Total 0.4 0.2 - 1.3 mg/dL    Albumin 3.8 3.4 - 5.0 g/dL    Protein Total 7.8 6.8 - 8.8 g/dL    Alkaline Phosphatase 98 40 - 150 U/L    ALT 30 0 - 70 U/L    AST 21 0 - 45 U/L   Hemoglobin A1c   Result Value Ref Range    Hemoglobin A1C 5.6 0 - 5.6 %      Comment:      Normal <5.7% Prediabetes 5.7-6.4%  Diabetes 6.5% or higher - adopted from ADA   consensus guidelines.

## 2019-10-31 NOTE — PROGRESS NOTES
"SUBJECTIVE:                                                    Sunny Samaniego is a 54 year old male who presents to clinic today for the following health issues:    Medication Followup of Norco, 1 tablet every 8 hours as needed for pain    Taking Medication as prescribed: yes    Side Effects:  None    Medication Helping Symptoms:  yes     *  Has been off blood pressure medication for the last 3 months    He has been taking pain medication 10 years  Started for bleeding ulcers, then right shoulder fracture/issues, orbital fracture 2017/with surgery. 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  Additional history: none    According to [facility] Database, last controlled prescription was:    Regular fills from this clinic  10/01/2019  2   10/01/2019  Hydrocodone-Acetamin 7.5-325  59.00 20 [address]  [id number]  [place] ([id number])  0/0 22.13 [place]   09/04/2019  2   09/04/2019  Hydrocodone-Acetamin 7.5-325  59.00 20 Dasha Nor  [id number]  [place] ([id number])  [place]   08/05/2019  1   08/05/2019  Hydrocodone-Acetamin 7.5-325  60.00 20 Ma Candelario  [id number]  [place] ([id number])  0/0 22.[place]   07/02/2019  1   07/02/2019  Hydrocodone-Acetamin 7.5-325  60.00 20 Ma Candelario  [id number]  [place] ([id number])  0/0 22.[place]   06/04/2019  1   06/04/2019  Hydrocodone-Acetamin 7.5-325  60.00 20 Ma Candelario  [id number]  [place] ([id number])  0/0 22.[place]   05/06/2019                     Patient Active Problem List   Diagnosis     Liver lesion     HTN (hypertension)     Microscopic hematuria     Elevated fasting glucose     Anxiety state     Long-term current use of opiate analgesic "     [facility]     AC (acromioclavicular) joint arthritis     Esophageal reflux     Acute upper GI bleed     Syncope     Pain in joint of right shoulder     Closed fracture of left orbital floor, sequela (H)     Past Surgical History:   Procedure Laterality Date     CHOLECYSTECTOMY  2009     ESOPHAGOSCOPY, GASTROSCOPY, DUODENOSCOPY (EGD), COMBINED N/A 12/23/2014    Procedure: COMBINED ESOPHAGOSCOPY, GASTROSCOPY, DUODENOSCOPY (EGD), BIOPSY SINGLE OR MULTIPLE;  Surgeon: Mesfin Milian MD;  Location: [facility]     OPEN REDUCTION INTERNAL FIXATION ORBIT BLOWOUT Left 9/5/2017    Procedure: OPEN REDUCTION INTERNAL FIXATION ORBIT BLOWOUT;  Open Reduction Left Eye Blow Out Fracture with Hardware Placement;  Surgeon: Chirag Aguiar MD;  Location:  [place]       Social History     Tobacco Use     Smoking status: Former Smoker     Packs/day: 0.00     Years: 20.00     Pack years: 0.00     Types: Cigarettes     Smokeless tobacco: Never Used     Tobacco comment: smokes 1/month   Substance Use Topics     Alcohol use: Yes     Alcohol/week: 0.0 - 2.0 standard drinks     Comment: occassional     Family History   Problem Relation Age of Onset     C.A.D. Maternal Grandfather      Prostate Cancer Paternal Grandfather      Hypertension Mother      Cerebrovascular Disease Father 79     Hypertension Brother      Diabetes No family hx of            ROS:  Other than noted above, general, HEENT, respiratory, cardiac, MS, and gastrointestinal systems are negative.     OBJECTIVE:                                                    BP (!) 144/99   Pulse 102   Temp 98.1  F (36.7  C) (Tympanic)   Wt 80 kg (176 lb 6.4 oz)   SpO2 99%   BMI 26.24 kg/m   Body mass index is 26.24 kg/m .   GENERAL: healthy, alert, well nourished, well hydrated, no distress  RESP: lungs clear to auscultation - no rales, no rhonchi, no wheezes  CV: regular rates and rhythm, normal S1 S2, no S3 or S4 and no murmur, no click or rub -  ABDOMEN: soft, no  tenderness, no  hepatosplenomegaly, no masses, normal bowel sounds  MS: extremities- no gross deformities noted, no edema  Right shoulder: limited ROM        ASSESSMENT/PLAN:                                                      ASSESSMENT/PLAN:      ICD-10-CM    1. Long-term current use of opiate analgesic Z79.891 HYDROcodone-acetaminophen (NORCO) 7.5-325 MG per tablet     Drug Abuse Screen Panel 13, Urine (Pain Care Package)     CANCELED: Drug Abuse Screen Panel 13, Urine (Pain Care Package)   2. Essential hypertension I10    3. Anxiety state F41.1    4. Microscopic hematuria R31.29    5. Elevated fasting glucose R73.01 Hemoglobin A1c   6. Benign essential hypertension I10 Comprehensive metabolic panel     losartan-hydrochlorothiazide (HYZAAR) 50-12.5 MG tablet     atenolol (TENORMIN) 100 MG tablet     **Basic metabolic panel FUTURE 1yr   7. Arthritis of right acromioclavicular joint M19.011 HYDROcodone-acetaminophen (NORCO) 7.5-325 MG per tablet     ORTHO  REFERRAL   8. Closed fracture of left orbital floor, sequela (H) S02.32XS OTOLARYNGOLOGY REFERRAL   9. Special screening for malignant neoplasms, colon Z12.11 Fecal colorectal cancer screen (FIT)   10. Screening for HIV (human immunodeficiency virus) Z11.4 HIV Antigen Antibody Combo   11. Screening for lipoid disorders Z13.220 CANCELED: Lipid panel reflex to direct LDL Fasting   12. Need for prophylactic vaccination and inoculation against influenza Z23 INFLUENZA VACCINE IM > 6 MONTHS VALENT IIV4 [15332]     Vaccine Administration, Initial [98329]     New patient to me. He states he would like to continue to see Dr. Bhandari. We discussed new [place] regulations/recommendations.    Recommended close follow up, every 3 months. Recommended next visit he signs new narcotic contract, he agrees.   He agrees to weaning process, he states he does not want to stay on narcotics forever. We discussed starting this process slowly.     He has not followed up  with ortho, says his main issue is shoulder pain. He is nervous about surgery and taking time off work. Recommended he consult with ortho as first step. He would consider injections/PT if appropriate.   He also has concerns of continued pain/paresthesias since orbital fracture/surgery, he is interested in follow up with ENT surgeon to discuss this.    Patient left without leaving urine - recommended repeat UA for hematuria, albumin, and urine drug screen. He told CMA he may not be able to go, went into bathroom, then left.  Letter written for him to wear tinted glasses when the lights at work bother his eye, he states this does not hamper his work.    Patient Instructions   Restart blood pressure medications  Recheck blood pressure in 2 weeks - nurse visit or pharmacy visit  Recheck fasting labwork in 1 month     Follow up with Dr. Aguiar ENT  Follow up with orthopedic surgeon for consult    Follow up with Dr. Bhandari in 3 months  We filled your monthly prescription for norco 59/month. Next month we can start #58 tablets/month for the next few months and continue the tapering process    Ramona Hall PA-C   [facility]

## 2019-10-31 NOTE — LETTER
[facility]  [address]  [facility] [address]  Phone: [phone]    October 31, 2019        Sunny Samaniego  [address] [facility] [address]          To whom it may concern:    RE: Sunny Samaniego    Patient was seen and treated today at our clinic.  He may be late for work today due to appointment.  Due to history of orbital fracture, patient should be allowed to wear tinted eyewear as needed for symptoms.    Please contact me for questions or concerns.      Sincerely,        Ramona Hall PA-C

## 2019-11-01 LAB — HIV 1+2 AB+HIV1 P24 AG SERPL QL IA: NONREACTIVE

## 2019-11-06 ENCOUNTER — TELEPHONE (OUTPATIENT)
Dept: FAMILY MEDICINE | Facility: CLINIC | Age: 54
End: 2019-11-06

## 2019-11-06 DIAGNOSIS — R31.29 MICROSCOPIC HEMATURIA: Primary | ICD-10-CM

## 2019-11-06 DIAGNOSIS — I10 HTN (HYPERTENSION): Chronic | ICD-10-CM

## 2019-11-06 DIAGNOSIS — Z79.891 LONG-TERM CURRENT USE OF OPIATE ANALGESIC: Chronic | ICD-10-CM

## 2019-11-06 ASSESSMENT — ANXIETY QUESTIONNAIRES
1. FEELING NERVOUS, ANXIOUS, OR ON EDGE: NOT AT ALL
5. BEING SO RESTLESS THAT IT IS HARD TO SIT STILL: NOT AT ALL
7. FEELING AFRAID AS IF SOMETHING AWFUL MIGHT HAPPEN: NOT AT ALL
GAD7 TOTAL SCORE: 1
2. NOT BEING ABLE TO STOP OR CONTROL WORRYING: NOT AT ALL
3. WORRYING TOO MUCH ABOUT DIFFERENT THINGS: NOT AT ALL
6. BECOMING EASILY ANNOYED OR IRRITABLE: SEVERAL DAYS

## 2019-11-06 ASSESSMENT — PATIENT HEALTH QUESTIONNAIRE - PHQ9
5. POOR APPETITE OR OVEREATING: NOT AT ALL
SUM OF ALL RESPONSES TO PHQ QUESTIONS 1-9: 7

## 2019-11-06 NOTE — TELEPHONE ENCOUNTER
Tried calling and the vm said it is not set up yet. Need to try a different time.  Bonilla Brown RN

## 2019-11-06 NOTE — TELEPHONE ENCOUNTER
Patient left his appointment last week without leaving urine - this is for albumin, UA, and urine drug screen. We had discussed these tests at the visit, but patient left bathroom without sample.  He will need to complete the urine sample prior to next refill of norco.  Also needs to follow up in 3 months in office for a recheck.  Ramona Hall PA-C

## 2019-11-07 ENCOUNTER — HEALTH MAINTENANCE LETTER (OUTPATIENT)
Age: 54
End: 2019-11-07

## 2019-11-07 ASSESSMENT — ANXIETY QUESTIONNAIRES: GAD7 TOTAL SCORE: 1

## 2019-11-07 NOTE — TELEPHONE ENCOUNTER
Call placed to patient.  Unable to leave call back message as voicemail has not been set up yet.  Sheila Hsieh RN

## 2019-11-08 NOTE — TELEPHONE ENCOUNTER
IDINCU message sent for message from [facility] as unable to reach patient x 2 attempts.  Sheila Hsieh RN

## 2019-12-02 DIAGNOSIS — Z79.891 LONG-TERM CURRENT USE OF OPIATE ANALGESIC: Chronic | ICD-10-CM

## 2019-12-02 DIAGNOSIS — M19.011 ARTHRITIS OF RIGHT ACROMIOCLAVICULAR JOINT: ICD-10-CM

## 2019-12-02 RX ORDER — HYDROCODONE BITARTRATE AND ACETAMINOPHEN 7.5; 325 MG/1; MG/1
1 TABLET ORAL EVERY 8 HOURS PRN
Qty: 58 TABLET | Refills: 0 | Status: SHIPPED | OUTPATIENT
Start: 2019-12-02 | End: 2019-12-30

## 2019-12-02 NOTE — TELEPHONE ENCOUNTER
Sunny is going to try to establish with a provider at [facility].  It's so far for him to come here and he lives 5 minutes away from [place].  He will keep us posted with that.      norco      Last Written Prescription Date:  10/31/19  Last Fill Quantity: 59,   # refills: 0  Last Office Visit: 10/31/19  Future Office visit:       Routing refill request to provider for review/approval because:  Drug not on the [facility], P or [facility] refill protocol or controlled substance

## 2019-12-30 DIAGNOSIS — M19.011 ARTHRITIS OF RIGHT ACROMIOCLAVICULAR JOINT: ICD-10-CM

## 2019-12-30 DIAGNOSIS — Z79.891 LONG-TERM CURRENT USE OF OPIATE ANALGESIC: Chronic | ICD-10-CM

## 2019-12-30 RX ORDER — HYDROCODONE BITARTRATE AND ACETAMINOPHEN 7.5; 325 MG/1; MG/1
1 TABLET ORAL EVERY 8 HOURS PRN
Qty: 56 TABLET | Refills: 0 | Status: SHIPPED | OUTPATIENT
Start: 2019-12-31 | End: 2020-01-30

## 2019-12-30 RX ORDER — HYDROCODONE BITARTRATE AND ACETAMINOPHEN 7.5; 325 MG/1; MG/1
1 TABLET ORAL EVERY 8 HOURS PRN
Qty: 56 TABLET | Refills: 0 | Status: CANCELLED | OUTPATIENT
Start: 2019-12-31

## 2019-12-30 NOTE — TELEPHONE ENCOUNTER
Patient calling today to get refill on his Norco, last filled on 12/2/2019 he is wanting it filled before tomorrow because he has already met his deductible. Please advise  Marianna Hightower CMA

## 2020-01-01 NOTE — TELEPHONE ENCOUNTER
Reason for Call:  Medication or medication refill:    Do you use a [facility]?  Name of the pharmacy and phone number for the current request:  See above     Name of the medication requested: norco and valium    Other request:     Can we leave a detailed message on this number? YES    Phone number patient can be reached at: Home number on file [phone] (home)    Best Time:     Call taken on 2/27/2017 at 8:55 AM by Judy Kidd         Vaginal Delivery

## 2020-01-29 DIAGNOSIS — M19.011 ARTHRITIS OF RIGHT ACROMIOCLAVICULAR JOINT: ICD-10-CM

## 2020-01-29 DIAGNOSIS — Z79.891 LONG-TERM CURRENT USE OF OPIATE ANALGESIC: Chronic | ICD-10-CM

## 2020-01-29 NOTE — TELEPHONE ENCOUNTER
Darlingco      Last Written Prescription Date:  12/31/19  Last Fill Quantity: 56,   # refills: 0  Last Office Visit: 10/31/19  Future Office visit:       Routing refill request to provider for review/approval because:  Drug not on the [facility], [facility] or [facility] refill protocol or controlled substance

## 2020-01-30 RX ORDER — HYDROCODONE BITARTRATE AND ACETAMINOPHEN 7.5; 325 MG/1; MG/1
1 TABLET ORAL EVERY 8 HOURS PRN
Qty: 54 TABLET | Refills: 0 | Status: SHIPPED | OUTPATIENT
Start: 2020-01-30 | End: 2020-02-27

## 2020-02-27 DIAGNOSIS — M19.011 ARTHRITIS OF RIGHT ACROMIOCLAVICULAR JOINT: ICD-10-CM

## 2020-02-27 DIAGNOSIS — Z79.891 LONG-TERM CURRENT USE OF OPIATE ANALGESIC: Chronic | ICD-10-CM

## 2020-02-27 RX ORDER — HYDROCODONE BITARTRATE AND ACETAMINOPHEN 7.5; 325 MG/1; MG/1
1 TABLET ORAL EVERY 8 HOURS PRN
Qty: 52 TABLET | Refills: 0 | Status: SHIPPED | OUTPATIENT
Start: 2020-02-27 | End: 2020-03-30

## 2020-02-27 NOTE — TELEPHONE ENCOUNTER
Darlingco      Last Written Prescription Date:  1/30/20  Last Fill Quantity: 54,   # refills: 0  Last Office Visit: 10/31/19  Future Office visit:       Routing refill request to provider for review/approval because:  Drug not on the [facility], P or [facility] refill protocol or controlled substance

## 2020-03-30 DIAGNOSIS — M19.011 ARTHRITIS OF RIGHT ACROMIOCLAVICULAR JOINT: ICD-10-CM

## 2020-03-30 DIAGNOSIS — Z79.891 LONG-TERM CURRENT USE OF OPIATE ANALGESIC: Chronic | ICD-10-CM

## 2020-03-30 RX ORDER — HYDROCODONE BITARTRATE AND ACETAMINOPHEN 7.5; 325 MG/1; MG/1
1 TABLET ORAL EVERY 8 HOURS PRN
Qty: 50 TABLET | Refills: 0 | Status: SHIPPED | OUTPATIENT
Start: 2020-03-30 | End: 2020-04-29

## 2020-04-27 DIAGNOSIS — M19.011 ARTHRITIS OF RIGHT ACROMIOCLAVICULAR JOINT: ICD-10-CM

## 2020-04-27 DIAGNOSIS — Z79.891 LONG-TERM CURRENT USE OF OPIATE ANALGESIC: Chronic | ICD-10-CM

## 2020-04-27 RX ORDER — HYDROCODONE BITARTRATE AND ACETAMINOPHEN 7.5; 325 MG/1; MG/1
1 TABLET ORAL EVERY 8 HOURS PRN
Qty: 50 TABLET | Refills: 0 | Status: CANCELLED | OUTPATIENT
Start: 2020-04-27

## 2020-04-27 NOTE — TELEPHONE ENCOUNTER
Requested Prescriptions   Pending Prescriptions Disp Refills     HYDROcodone-acetaminophen (NORCO) 7.5-325 MG per tablet 50 tablet 0     Sig: Take 1 tablet by mouth every 8 hours as needed for moderate to severe pain       There is no refill protocol information for this order        Olga Dumont  [facility]

## 2020-04-29 ENCOUNTER — VIRTUAL VISIT (OUTPATIENT)
Dept: FAMILY MEDICINE | Facility: CLINIC | Age: 55
End: 2020-04-29
Payer: COMMERCIAL

## 2020-04-29 DIAGNOSIS — I10 ESSENTIAL HYPERTENSION: Primary | ICD-10-CM

## 2020-04-29 DIAGNOSIS — M19.011 ARTHRITIS OF RIGHT ACROMIOCLAVICULAR JOINT: ICD-10-CM

## 2020-04-29 DIAGNOSIS — Z79.891 LONG-TERM CURRENT USE OF OPIATE ANALGESIC: Chronic | ICD-10-CM

## 2020-04-29 PROCEDURE — 99214 OFFICE O/P EST MOD 30 MIN: CPT | Mod: TEL | Performed by: FAMILY MEDICINE

## 2020-04-29 RX ORDER — HYDROCODONE BITARTRATE AND ACETAMINOPHEN 7.5; 325 MG/1; MG/1
1 TABLET ORAL EVERY 8 HOURS PRN
Qty: 48 TABLET | Refills: 0 | Status: SHIPPED | OUTPATIENT
Start: 2020-04-29 | End: 2020-05-28

## 2020-04-29 ASSESSMENT — PAIN SCALES - GENERAL: PAINLEVEL: MILD PAIN (3)

## 2020-04-29 NOTE — PROGRESS NOTES
"Sunny Samaniego is a 54 year old male who is being evaluated via a billable telephone visit.      The patient has been notified of following:     \"This telephone visit will be conducted via a call between you and your physician/provider. We have found that certain health care needs can be provided without the need for a physical exam.  This service lets us provide the care you need with a short phone conversation.  If a prescription is necessary we can send it directly to your pharmacy.  If lab work is needed we can place an order for that and you can then stop by our lab to have the test done at a later time.    Telephone visits are billed at different rates depending on your insurance coverage. During this emergency period, for some insurers they may be billed the same as an in-person visit.  Please reach out to your insurance provider with any questions.    If during the course of the call the physician/provider feels a telephone visit is not appropriate, you will not be charged for this service.\"    Patient has given verbal consent for Telephone visit?  Yes     How would you like to obtain your AVS? Mail a copy    Subjective     Sunny Samaniego is a 54 year old male who presents to clinic today for the following health issues:    HPI  Hypertension Follow-up      Do you check your blood pressure regularly outside of the clinic? No     Are you following a low salt diet? Yes he watches it    Are your blood pressures ever more than 140 on the top number (systolic) OR more   than 90 on the bottom number (diastolic), for example 140/90? Yes      How many servings of fruits and vegetables do you eat daily?  0-1    On average, how many sweetened beverages do you drink each day (Examples: soda, juice, sweet tea, etc.  Do NOT count diet or artificially sweetened beverages)?   5    How many days per week do you exercise enough to make your heart beat faster? 7    How many minutes a day do you exercise enough to make " your heart beat faster? 60 or more    How many days per week do you miss taking your medication? 0    BP Readings from Last 6 Encounters:   10/31/19 (!) 144/99   10/03/18 (!) 146/102   05/18/18 (!) 159/105   11/15/17 (!) 154/101   10/30/17 144/86   09/21/17 130/89         He has not checked   He was in a lot of stress. He feels better and does not have to take stomach medications  He has been much less stress   Has been away from his wife and things are much better.   Has not taken  His hypertension medications for a while  Has no headache no chest pain  No soa  He is anxious as he does have to go to work every day but seems to be handling this well  Takes 1 norco before bed and 1/2-1 as needed during the day.  Has been tolerating the slow taper.       Patient Active Problem List   Diagnosis     Liver lesion     HTN (hypertension)     Microscopic hematuria     Elevated fasting glucose     Anxiety state     Long-term current use of opiate analgesic     [facility]     AC (acromioclavicular) joint arthritis     Esophageal reflux     Acute upper GI bleed     Syncope     Pain in joint of right shoulder     Closed fracture of left orbital floor, sequela (H)     Past Surgical History:   Procedure Laterality Date     CHOLECYSTECTOMY  2009     ESOPHAGOSCOPY, GASTROSCOPY, DUODENOSCOPY (EGD), COMBINED N/A 12/23/2014    Procedure: COMBINED ESOPHAGOSCOPY, GASTROSCOPY, DUODENOSCOPY (EGD), BIOPSY SINGLE OR MULTIPLE;  Surgeon: Mesfin Milian MD;  Location: [facility]     OPEN REDUCTION INTERNAL FIXATION ORBIT BLOWOUT Left 9/5/2017    Procedure: OPEN REDUCTION INTERNAL FIXATION ORBIT BLOWOUT;  Open Reduction Left Eye Blow Out Fracture with Hardware Placement;  Surgeon: Chirag Aguiar MD;  Location:  [place]       Social History     Tobacco Use     Smoking status: Former Smoker     Packs/day: 0.00     Years: 20.00     Pack years: 0.00     Types: Cigarettes     Smokeless tobacco: Never Used     Tobacco comment: smokes 1/month    Substance Use Topics     Alcohol use: Yes     Alcohol/week: 0.0 - 2.0 standard drinks     Comment: occassional     Family History   Problem Relation Age of Onset     C.A.D. Maternal Grandfather      Prostate Cancer Paternal Grandfather      Hypertension Mother      Cerebrovascular Disease Father 79     Hypertension Brother      Diabetes No family hx of            Reviewed and updated as needed this visit by Provider         Review of Systems   ROS COMP: Constitutional, HEENT, cardiovascular, pulmonary, gi and gu systems are negative, except as otherwise noted.       Objective   Reported vitals:  There were no vitals taken for this visit.   healthy, alert and no distress  PSYCH: Alert and oriented times 3; coherent speech, normal   rate and volume, able to articulate logical thoughts, able   to abstract reason, no tangential thoughts, no hallucinations   or delusions  His affect is normal  RESP: No cough, no audible wheezing, able to talk in full sentences  Remainder of exam unable to be completed due to telephone visits    Diagnostic Test Results:  Labs reviewed in Epic        Assessment/Plan:  1. Arthritis of right acromioclavicular joint    - HYDROcodone-acetaminophen (NORCO) 7.5-325 MG per tablet; Take 1 tablet by mouth every 8 hours as needed for moderate to severe pain  Dispense: 48 tablet; Refill: 0    2. Long-term current use of opiate analgesic    - HYDROcodone-acetaminophen (NORCO) 7.5-325 MG per tablet; Take 1 tablet by mouth every 8 hours as needed for moderate to severe pain  Dispense: 48 tablet; Refill: 0    3. Essential hypertension    Patient Instructions   Get a blood pressure cuff and take your blood pressure the next few weeks. Then call [phone] and they can put them in your chart and send them to me. We can figure out if you still need medication and the right dosing.   We are keeping on the slow weaning schedule.           Return in about 3 weeks (around 5/20/2020) for BP Recheck  virtual.      Phone call duration:  12 minutes    Talia Bhandari MD

## 2020-04-29 NOTE — PATIENT INSTRUCTIONS
Get a blood pressure cuff and take your blood pressure the next few weeks. Then call [phone] and they can put them in your chart and send them to me. We can figure out if you still need medication and the right dosing.   We are keeping on the slow weaning schedule.

## 2020-05-27 DIAGNOSIS — M19.011 ARTHRITIS OF RIGHT ACROMIOCLAVICULAR JOINT: ICD-10-CM

## 2020-05-27 DIAGNOSIS — Z79.891 LONG-TERM CURRENT USE OF OPIATE ANALGESIC: Chronic | ICD-10-CM

## 2020-05-27 RX ORDER — HYDROCODONE BITARTRATE AND ACETAMINOPHEN 7.5; 325 MG/1; MG/1
1 TABLET ORAL EVERY 8 HOURS PRN
Qty: 48 TABLET | Refills: 0 | Status: CANCELLED | OUTPATIENT
Start: 2020-05-27

## 2020-05-27 NOTE — TELEPHONE ENCOUNTER
Norco    (has virtual visit tomorrow 5/28/20)  Last Written Prescription Date:  4/29/20  Last Fill Quantity: 48,   # refills: 0  Last Office Visit: 4/29/20  Future Office visit:    Next 5 appointments (look out 90 days)    May 28, 2020  2:00 PM CDT  [facility]phone [facility] [address] [facility] [address]  [phone]           Routing refill request to provider for review/approval because:  Drug not on the [facility], [facility] or [facility] refill protocol or controlled substance

## 2020-05-28 ENCOUNTER — VIRTUAL VISIT (OUTPATIENT)
Dept: FAMILY MEDICINE | Facility: CLINIC | Age: 55
End: 2020-05-28
Payer: COMMERCIAL

## 2020-05-28 DIAGNOSIS — I10 ESSENTIAL HYPERTENSION: ICD-10-CM

## 2020-05-28 DIAGNOSIS — M19.011 ARTHRITIS OF RIGHT ACROMIOCLAVICULAR JOINT: ICD-10-CM

## 2020-05-28 DIAGNOSIS — Z79.891 LONG-TERM CURRENT USE OF OPIATE ANALGESIC: Chronic | ICD-10-CM

## 2020-05-28 DIAGNOSIS — M25.511 PAIN IN JOINT OF RIGHT SHOULDER: Primary | ICD-10-CM

## 2020-05-28 PROCEDURE — 99214 OFFICE O/P EST MOD 30 MIN: CPT | Mod: TEL | Performed by: FAMILY MEDICINE

## 2020-05-28 RX ORDER — HYDROCODONE BITARTRATE AND ACETAMINOPHEN 7.5; 325 MG/1; MG/1
1 TABLET ORAL EVERY 8 HOURS PRN
Qty: 46 TABLET | Refills: 0 | Status: SHIPPED | OUTPATIENT
Start: 2020-05-28 | End: 2020-06-30

## 2020-05-28 NOTE — PROGRESS NOTES
"Sunny Samaniego is a 54 year old male who is being evaluated via a billable telephone visit.      The patient has been notified of following:     \"This telephone visit will be conducted via a call between you and your physician/provider. We have found that certain health care needs can be provided without the need for a physical exam.  This service lets us provide the care you need with a short phone conversation.  If a prescription is necessary we can send it directly to your pharmacy.  If lab work is needed we can place an order for that and you can then stop by our lab to have the test done at a later time.    Telephone visits are billed at different rates depending on your insurance coverage. During this emergency period, for some insurers they may be billed the same as an in-person visit.  Please reach out to your insurance provider with any questions.    If during the course of the call the physician/provider feels a telephone visit is not appropriate, you will not be charged for this service.\"    Patient has given verbal consent for Telephone visit?  Yes    What phone number would you like to be contacted at? [phone]     How would you like to obtain your AVS? Mail a copy    Subjective     Sunny Samaniego is a 54 year old male who presents via phone visit today for the following health issues:    HPI  Hypertension Follow-up      Do you check your blood pressure regularly outside of the clinic? Yes     Are you following a low salt diet? Yes    Are your blood pressures ever more than 140 on the top number (systolic) OR more   than 90 on the bottom number (diastolic), for example 140/90? No, they have been 120-130's/80's      Medication Followup of Norco    Taking Medication as prescribed: yes    Side Effects:  None    Medication Helping Symptoms:  yes     His blood pressure is now 130-140/80  He is still taking the lisinopril  Pulse is higher   He is now off the atenolol and he feels great. The blood " pressure  Are good  We continue his slow wean of pain medication he is tolerating this well.         Patient Active Problem List   Diagnosis     Liver lesion     HTN (hypertension)     Microscopic hematuria     Elevated fasting glucose     Anxiety state     Long-term current use of opiate analgesic     [facility]     AC (acromioclavicular) joint arthritis     Esophageal reflux     Acute upper GI bleed     Syncope     Pain in joint of right shoulder     Closed fracture of left orbital floor, sequela (H)     Past Surgical History:   Procedure Laterality Date     CHOLECYSTECTOMY  2009     ESOPHAGOSCOPY, GASTROSCOPY, DUODENOSCOPY (EGD), COMBINED N/A 12/23/2014    Procedure: COMBINED ESOPHAGOSCOPY, GASTROSCOPY, DUODENOSCOPY (EGD), BIOPSY SINGLE OR MULTIPLE;  Surgeon: Mesfin Milian MD;  Location: [facility]     OPEN REDUCTION INTERNAL FIXATION ORBIT BLOWOUT Left 9/5/2017    Procedure: OPEN REDUCTION INTERNAL FIXATION ORBIT BLOWOUT;  Open Reduction Left Eye Blow Out Fracture with Hardware Placement;  Surgeon: Chirag Aguiar MD;  Location:  OR       Social History     Tobacco Use     Smoking status: Former Smoker     Packs/day: 0.00     Years: 20.00     Pack years: 0.00     Types: Cigarettes     Smokeless tobacco: Never Used     Tobacco comment: smokes 1/month   Substance Use Topics     Alcohol use: Yes     Alcohol/week: 0.0 - 2.0 standard drinks     Comment: occassional     Family History   Problem Relation Age of Onset     C.A.D. Maternal Grandfather      Prostate Cancer Paternal Grandfather      Hypertension Mother      Cerebrovascular Disease Father 79     Hypertension Brother      Diabetes No family hx of            Reviewed and updated as needed this visit by Provider         Review of Systems   Constitutional, HEENT, cardiovascular, pulmonary, gi and gu systems are negative, except as otherwise noted.       Objective   Reported vitals:  There were no vitals taken for this visit.   healthy, alert and no  distress  PSYCH: Alert and oriented times 3; coherent speech, normal   rate and volume, able to articulate logical thoughts, able   to abstract reason, no tangential thoughts, no hallucinations   or delusions  His affect is normal  RESP: No cough, no audible wheezing, able to talk in full sentences  Remainder of exam unable to be completed due to telephone visits    Diagnostic Test Results:  Labs reviewed in Epic        Assessment/Plan:  1. Pain in joint of right shoulder  stil there     2. Long-term current use of opiate analgesic  weaining 2 per month with goal to get down to 30 per month or lower   - HYDROcodone-acetaminophen (NORCO) 7.5-325 MG per tablet; Take 1 tablet by mouth every 8 hours as needed for moderate to severe pain  Dispense: 46 tablet; Refill: 0    3. Arthritis of right acromioclavicular joint    - HYDROcodone-acetaminophen (NORCO) 7.5-325 MG per tablet; Take 1 tablet by mouth every 8 hours as needed for moderate to severe pain  Dispense: 46 tablet; Refill: 0        4. Essential hypertension  Well controlled off of the atenolol  Cont the lisinopril /hctz  Patient Instructions   Keep on the lisinopril /hydrochlorothiazide          Phone call duration:  9 minutes    Talia Bhandari MD

## 2020-06-02 ENCOUNTER — TELEPHONE (OUTPATIENT)
Dept: FAMILY MEDICINE | Facility: CLINIC | Age: 55
End: 2020-06-02

## 2020-06-02 DIAGNOSIS — I10 BENIGN ESSENTIAL HYPERTENSION: ICD-10-CM

## 2020-06-02 RX ORDER — LOSARTAN POTASSIUM AND HYDROCHLOROTHIAZIDE 12.5; 5 MG/1; MG/1
1 TABLET ORAL DAILY
Qty: 90 TABLET | Refills: 0 | Status: SHIPPED | OUTPATIENT
Start: 2020-06-02 | End: 2020-09-30

## 2020-06-02 NOTE — TELEPHONE ENCOUNTER
Sunny reports BP of 145/101 at dentist today so they did not pull the tooth. He said that he has not been taking his blood pressure medication regularly.He said that he does not take lisinopril  but that he should be taking  Losartan-hydrochlorothiazide and needs refills. Refill ordered and advised to MyChart BP readings. He said he has a home monitor. He said that he has a dentist appointment next week to hopefully pull the tooth.  Bonilla Brown RN

## 2020-06-02 NOTE — TELEPHONE ENCOUNTER
Reason for call:  Patient reporting a symptom    Symptom or request: Sunny LEFT MESSAGE:  He was at dentist this morning to have a tooth extracted and apparently his b/p was high.  Didn't state in message if he had it pulled or not.  He would like b/p medication send over to [facility].  (currently on losartan-hydrochlorothiazide).  Please call and assess. Thank you..Kat Hicks    Duration (how long have symptoms been present): this morning    Have you been treated for this before? Currently on medication    Additional comments:   [facility]    Phone Number patient can be reached at:  Home number on file [phone] (home)    Best Time:  Any time    Can we leave a detailed message on this number:  YES    Call taken on 6/2/2020 at 1:23 PM by Kat Hicks

## 2020-06-29 ENCOUNTER — TELEPHONE (OUTPATIENT)
Dept: FAMILY MEDICINE | Facility: CLINIC | Age: 55
End: 2020-06-29

## 2020-06-29 NOTE — TELEPHONE ENCOUNTER
losartan-hydrochlorothiazide (HYZAAR) 50-12.5 MG tablet  90 tablet  0  6/2/2020   No    Sig - Route: Take 1 tablet by mouth daily - Oral    Sent to pharmacy as: Losartan Potassium-HCTZ 50-12.5 MG Oral Tablet (Hyzaar)    Class: E-Prescribe    Order: [id number]    E-Prescribing Status: Receipt confirmed by pharmacy (6/2/2020  1:39 PM CDT)    Printout Tracking     External Result Report    Pharmacy     [id number] [place], [place] - [address] [facility]

## 2020-06-29 NOTE — TELEPHONE ENCOUNTER
Pt left message wants refill for Losartan-hydrochlorothiazide   Last Written Prescription Date:  6-2-20  Last Fill Quantity: 90,   # refills: 0  Last Office Visit: 5-28-20  Future Office visit:0       Routing refill request to provider for review/approval because:  Drug not on the [facility], [facility] or [facility] refill protocol or controlled substance

## 2020-07-24 DIAGNOSIS — M19.011 ARTHRITIS OF RIGHT ACROMIOCLAVICULAR JOINT: ICD-10-CM

## 2020-07-24 DIAGNOSIS — Z79.891 LONG-TERM CURRENT USE OF OPIATE ANALGESIC: Chronic | ICD-10-CM

## 2020-07-24 NOTE — TELEPHONE ENCOUNTER
Norco      Last Written Prescription Date:  6/30/20  Last Fill Quantity: 44,   # refills: 0  Last Office Visit: 5/28/20 (virtual visit)  Future Office visit:       Routing refill request to provider for review/approval because:  Drug not on the [facility], [facility] or [facility] refill protocol or controlled substance

## 2020-07-27 RX ORDER — HYDROCODONE BITARTRATE AND ACETAMINOPHEN 7.5; 325 MG/1; MG/1
1 TABLET ORAL EVERY 8 HOURS PRN
Qty: 42 TABLET | Refills: 0 | Status: SHIPPED | OUTPATIENT
Start: 2020-07-27 | End: 2020-09-01

## 2020-09-01 DIAGNOSIS — Z79.891 LONG-TERM CURRENT USE OF OPIATE ANALGESIC: Chronic | ICD-10-CM

## 2020-09-01 DIAGNOSIS — M19.011 ARTHRITIS OF RIGHT ACROMIOCLAVICULAR JOINT: ICD-10-CM

## 2020-09-01 RX ORDER — HYDROCODONE BITARTRATE AND ACETAMINOPHEN 7.5; 325 MG/1; MG/1
1 TABLET ORAL EVERY 8 HOURS PRN
Qty: 40 TABLET | Refills: 0 | Status: SHIPPED | OUTPATIENT
Start: 2020-09-01 | End: 2020-09-30

## 2020-09-01 NOTE — TELEPHONE ENCOUNTER
Darlingco      Last Written Prescription Date:  7/27/20  Last Fill Quantity: 42,   # refills: 0  Last Office Visit: 5/28/20  Future Office visit:       Routing refill request to provider for review/approval because:  Drug not on the [facility], [facility] or [facility] refill protocol or controlled substance

## 2020-09-28 NOTE — NURSING NOTE
"Chief Complaint   Patient presents with     Infection       Initial /86 (BP Location: Right arm, Patient Position: Chair, Cuff Size: Adult Large)  Ht 5' 8\" (1.727 m)  Wt 183 lb (83 kg)  BMI 27.83 kg/m2 Estimated body mass index is 27.83 kg/(m^2) as calculated from the following:    Height as of this encounter: 5' 8\" (1.727 m).    Weight as of this encounter: 183 lb (83 kg).  Medication Reconciliation: complete     Layo Sosa CMA    " No

## 2020-09-30 DIAGNOSIS — Z79.891 LONG-TERM CURRENT USE OF OPIATE ANALGESIC: Chronic | ICD-10-CM

## 2020-09-30 DIAGNOSIS — I10 BENIGN ESSENTIAL HYPERTENSION: ICD-10-CM

## 2020-09-30 DIAGNOSIS — M19.011 ARTHRITIS OF RIGHT ACROMIOCLAVICULAR JOINT: ICD-10-CM

## 2020-09-30 RX ORDER — HYDROCODONE BITARTRATE AND ACETAMINOPHEN 7.5; 325 MG/1; MG/1
1 TABLET ORAL EVERY 8 HOURS PRN
Qty: 38 TABLET | Refills: 0 | Status: SHIPPED | OUTPATIENT
Start: 2020-09-30 | End: 2020-11-02

## 2020-09-30 RX ORDER — LOSARTAN POTASSIUM AND HYDROCHLOROTHIAZIDE 12.5; 5 MG/1; MG/1
1 TABLET ORAL DAILY
Qty: 90 TABLET | Refills: 0 | Status: SHIPPED | OUTPATIENT
Start: 2020-09-30 | End: 2021-01-07

## 2020-09-30 NOTE — TELEPHONE ENCOUNTER
patient requesting a refill on his meds.    Soraida Kidd [facility]

## 2020-11-02 DIAGNOSIS — Z79.891 LONG-TERM CURRENT USE OF OPIATE ANALGESIC: Chronic | ICD-10-CM

## 2020-11-02 DIAGNOSIS — M19.011 ARTHRITIS OF RIGHT ACROMIOCLAVICULAR JOINT: ICD-10-CM

## 2020-11-02 RX ORDER — HYDROCODONE BITARTRATE AND ACETAMINOPHEN 7.5; 325 MG/1; MG/1
1 TABLET ORAL EVERY 8 HOURS PRN
Qty: 36 TABLET | Refills: 0 | Status: SHIPPED | OUTPATIENT
Start: 2020-11-02 | End: 2020-11-30

## 2020-11-02 NOTE — TELEPHONE ENCOUNTER
Reason for Call:  Other prescription    Detailed comments: norco-    Phone Number Patient can be reached at: Home number on file [phone] (home)    Best Time: any    Can we leave a detailed message on this number? YES    Call taken on 11/2/2020 at 9:18 AM by Emerald Newby

## 2020-11-29 ENCOUNTER — HEALTH MAINTENANCE LETTER (OUTPATIENT)
Age: 55
End: 2020-11-29

## 2020-11-30 DIAGNOSIS — M19.011 ARTHRITIS OF RIGHT ACROMIOCLAVICULAR JOINT: ICD-10-CM

## 2020-11-30 DIAGNOSIS — Z79.891 LONG-TERM CURRENT USE OF OPIATE ANALGESIC: Chronic | ICD-10-CM

## 2020-11-30 RX ORDER — HYDROCODONE BITARTRATE AND ACETAMINOPHEN 7.5; 325 MG/1; MG/1
1 TABLET ORAL EVERY 8 HOURS PRN
Qty: 36 TABLET | Refills: 0 | Status: SHIPPED | OUTPATIENT
Start: 2020-11-30 | End: 2021-01-07

## 2020-11-30 NOTE — TELEPHONE ENCOUNTER
patient called for a refill for his Norco please.    Khang Godwin [facility]

## 2021-01-04 ENCOUNTER — TELEPHONE (OUTPATIENT)
Dept: FAMILY MEDICINE | Facility: CLINIC | Age: 56
End: 2021-01-04

## 2021-01-04 NOTE — TELEPHONE ENCOUNTER
Reason for Call:  Medication or medication refill:    Do you use a [facility]?  Name of the pharmacy and phone number for the current request:  [facility] [place]   [id number] - [place], [place] - [address] AT [facility]    Name of the medication requested: hydrocodone    Other request: Pt is calling stating he is completely out of it and is feeling the effects.  Pls fill asap.    Can we leave a detailed message on this number? YES    Phone number patient can be reached at: Home number on file [phone] (home)    Best Time: any    Call taken on 1/4/2021 at 2:35 PM by Alissa Marcum

## 2021-01-05 NOTE — TELEPHONE ENCOUNTER
Please have him make an in person office visit. He needs labs, to renew his pain contract etc. Talia Bhandari M.D.     Acuity of illness

## 2021-01-06 NOTE — TELEPHONE ENCOUNTER
Tried to call and continuous ring, could not leave a message.    Olga Dumont  [facility]

## 2021-01-07 ENCOUNTER — OFFICE VISIT (OUTPATIENT)
Dept: FAMILY MEDICINE | Facility: CLINIC | Age: 56
End: 2021-01-07
Payer: COMMERCIAL

## 2021-01-07 VITALS
BODY MASS INDEX: 27.55 KG/M2 | DIASTOLIC BLOOD PRESSURE: 99 MMHG | TEMPERATURE: 97.7 F | WEIGHT: 186 LBS | HEIGHT: 69 IN | OXYGEN SATURATION: 100 % | HEART RATE: 95 BPM | SYSTOLIC BLOOD PRESSURE: 150 MMHG

## 2021-01-07 DIAGNOSIS — R73.9 ELEVATED BLOOD SUGAR: ICD-10-CM

## 2021-01-07 DIAGNOSIS — I10 BENIGN ESSENTIAL HYPERTENSION: ICD-10-CM

## 2021-01-07 DIAGNOSIS — Z79.891 LONG-TERM CURRENT USE OF OPIATE ANALGESIC: Chronic | ICD-10-CM

## 2021-01-07 DIAGNOSIS — R73.03 PREDIABETES: ICD-10-CM

## 2021-01-07 DIAGNOSIS — E78.5 HYPERLIPIDEMIA LDL GOAL <100: Primary | ICD-10-CM

## 2021-01-07 DIAGNOSIS — Z86.2 HISTORY OF ANEMIA: ICD-10-CM

## 2021-01-07 DIAGNOSIS — M19.011 ARTHRITIS OF RIGHT ACROMIOCLAVICULAR JOINT: ICD-10-CM

## 2021-01-07 LAB
ANION GAP SERPL CALCULATED.3IONS-SCNC: 5 MMOL/L (ref 3–14)
BUN SERPL-MCNC: 16 MG/DL (ref 7–30)
CALCIUM SERPL-MCNC: 9.5 MG/DL (ref 8.5–10.1)
CHLORIDE SERPL-SCNC: 105 MMOL/L (ref 94–109)
CHOLEST SERPL-MCNC: 172 MG/DL
CO2 SERPL-SCNC: 25 MMOL/L (ref 20–32)
CREAT SERPL-MCNC: 0.89 MG/DL (ref 0.66–1.25)
CREAT UR-MCNC: 316 MG/DL
ERYTHROCYTE [DISTWIDTH] IN BLOOD BY AUTOMATED COUNT: 13.4 % (ref 10–15)
GFR SERPL CREATININE-BSD FRML MDRD: >90 ML/MIN/{1.73_M2}
GLUCOSE SERPL-MCNC: 136 MG/DL (ref 70–99)
HBA1C MFR BLD: 6.3 % (ref 0–5.6)
HCT VFR BLD AUTO: 47.1 % (ref 40–53)
HDLC SERPL-MCNC: 49 MG/DL
HGB BLD-MCNC: 15.6 G/DL (ref 13.3–17.7)
LDLC SERPL CALC-MCNC: 78 MG/DL
MCH RBC QN AUTO: 28.4 PG (ref 26.5–33)
MCHC RBC AUTO-ENTMCNC: 33.1 G/DL (ref 31.5–36.5)
MCV RBC AUTO: 86 FL (ref 78–100)
MICROALBUMIN UR-MCNC: 28 MG/L
MICROALBUMIN/CREAT UR: 9.02 MG/G CR (ref 0–17)
NONHDLC SERPL-MCNC: 123 MG/DL
PLATELET # BLD AUTO: 323 10E9/L (ref 150–450)
POTASSIUM SERPL-SCNC: 3.8 MMOL/L (ref 3.4–5.3)
RBC # BLD AUTO: 5.49 10E12/L (ref 4.4–5.9)
SODIUM SERPL-SCNC: 135 MMOL/L (ref 133–144)
TRIGL SERPL-MCNC: 225 MG/DL
WBC # BLD AUTO: 7 10E9/L (ref 4–11)

## 2021-01-07 PROCEDURE — 36415 COLL VENOUS BLD VENIPUNCTURE: CPT | Performed by: FAMILY MEDICINE

## 2021-01-07 PROCEDURE — 80048 BASIC METABOLIC PNL TOTAL CA: CPT | Performed by: FAMILY MEDICINE

## 2021-01-07 PROCEDURE — 83036 HEMOGLOBIN GLYCOSYLATED A1C: CPT | Performed by: FAMILY MEDICINE

## 2021-01-07 PROCEDURE — 99214 OFFICE O/P EST MOD 30 MIN: CPT | Performed by: FAMILY MEDICINE

## 2021-01-07 PROCEDURE — 80061 LIPID PANEL: CPT | Performed by: FAMILY MEDICINE

## 2021-01-07 PROCEDURE — 82043 UR ALBUMIN QUANTITATIVE: CPT | Performed by: FAMILY MEDICINE

## 2021-01-07 PROCEDURE — 85027 COMPLETE CBC AUTOMATED: CPT | Performed by: FAMILY MEDICINE

## 2021-01-07 RX ORDER — LOSARTAN POTASSIUM AND HYDROCHLOROTHIAZIDE 12.5; 5 MG/1; MG/1
1 TABLET ORAL DAILY
Qty: 90 TABLET | Refills: 3 | Status: SHIPPED | OUTPATIENT
Start: 2021-01-07

## 2021-01-07 RX ORDER — HYDROCODONE BITARTRATE AND ACETAMINOPHEN 7.5; 325 MG/1; MG/1
1 TABLET ORAL EVERY 8 HOURS PRN
Qty: 36 TABLET | Refills: 0 | Status: SHIPPED | OUTPATIENT
Start: 2021-01-07 | End: 2021-02-10

## 2021-01-07 ASSESSMENT — MIFFLIN-ST. JEOR: SCORE: 1660.1

## 2021-01-07 NOTE — PROGRESS NOTES
"      Zia Correa is a 55 year old who presents to clinic today for the following health issues   HPI       Hypertension Follow-up    Do you check your blood pressure regularly outside of the clinic? Yes     Are you following a low salt diet? No    Are your blood pressures ever more than 140 on the top number (systolic) OR more   than 90 on the bottom number (diastolic), for example 140/90? No    BP Readings from Last 6 Encounters:   01/07/21 (!) 150/99   10/31/19 (!) 144/99   10/03/18 (!) 146/102   05/18/18 (!) 159/105   11/15/17 (!) 154/101   10/30/17 144/86       Still taking the hydrocodone has the right shoulder still gives him pain but he can move it   Still weaker since the injury  Has been using th ehc mostly to sleep  Also needs labs today   Blood pressure still elevated   Has been limiting tylenol and ibuprofen   With his previous bleeding ulcer he shouldn't be takeing any   Will check cbc toda     Denies bleeding , abdominal pain , fatigue , tachycardia  Review of Systems   Constitutional, HEENT, cardiovascular, pulmonary, gi and gu systems are negative, except as otherwise noted.      Objective    BP (!) 150/99   Pulse 95   Temp 97.7  F (36.5  C) (Tympanic)   Ht 1.746 m (5' 8.75\")   Wt 84.4 kg (186 lb)   SpO2 100%   BMI 27.67 kg/m    Body mass index is 27.67 kg/m .  Physical Exam   GENERAL: healthy, alert and no distress  EYES: Eyes grossly normal to inspection, PERRL and conjunctivae and sclerae normal  HENT: ear canals and TM's normal, nose and mouth without ulcers or lesions  NECK: no adenopathy, no asymmetry, masses, or scars and thyroid normal to palpation  RESP: lungs clear to auscultation - no rales, rhonchi or wheezes  CV: regular rate and rhythm, normal S1 S2, no S3 or S4, no murmur, click or rub, no peripheral edema and peripheral pulses strong  MS: RUE shoulder exam shows prominence of the ac joint pain limits extension and in/ext roation   NEURO: Normal strength and tone, " mentation intact and speech normal  PSYCH: mentation appears normal, affect normal/bright  1. Benign essential hypertension  Not optimal recheck   - Basic metabolic panel  - losartan-hydrochlorothiazide (HYZAAR) 50-12.5 MG tablet; Take 1 tablet by mouth daily  Dispense: 90 tablet; Refill: 3  - Albumin Random Urine Quantitative with Creat Ratio    2. Arthritis of right acromioclavicular joint  Tapered   - HYDROcodone-acetaminophen (NORCO) 7.5-325 MG per tablet; Take 1 tablet by mouth every 8 hours as needed for moderate to severe pain  Dispense: 36 tablet; Refill: 0    3. Long-term current use of opiate analgesic    - HYDROcodone-acetaminophen (NORCO) 7.5-325 MG per tablet; Take 1 tablet by mouth every 8 hours as needed for moderate to severe pain  Dispense: 36 tablet; Refill: 0    4. Hyperlipidemia LDL goal <100  Check lab   - Lipid panel reflex to direct LDL Non-fasting    5. Elevated blood sugar  Recheck as there were several elevated blood sugar   - Hemoglobin A1c   Results for orders placed or performed in visit on 01/07/21   Lipid panel reflex to direct LDL Non-fasting     Status: Abnormal   Result Value Ref Range    Cholesterol 172 <200 mg/dL    Triglycerides 225 (H) <150 mg/dL    HDL Cholesterol 49 >39 mg/dL    LDL Cholesterol Calculated 78 <100 mg/dL    Non HDL Cholesterol 123 <130 mg/dL   Basic metabolic panel     Status: Abnormal   Result Value Ref Range    Sodium 135 133 - 144 mmol/L    Potassium 3.8 3.4 - 5.3 mmol/L    Chloride 105 94 - 109 mmol/L    Carbon Dioxide 25 20 - 32 mmol/L    Anion Gap 5 3 - 14 mmol/L    Glucose 136 (H) 70 - 99 mg/dL    Urea Nitrogen 16 7 - 30 mg/dL    Creatinine 0.89 0.66 - 1.25 mg/dL    GFR Estimate >90 >60 mL/min/[1.73_m2]    GFR Estimate If Black >90 >60 mL/min/[1.73_m2]    Calcium 9.5 8.5 - 10.1 mg/dL   Albumin Random Urine Quantitative with Creat Ratio     Status: None   Result Value Ref Range    Creatinine Urine 316 mg/dL    Albumin Urine mg/L 28 mg/L    Albumin Urine  mg/g Cr 9.02 0 - 17 mg/g Cr   Hemoglobin A1c     Status: Abnormal   Result Value Ref Range    Hemoglobin A1C 6.3 (H) 0 - 5.6 %   CBC with platelets     Status: None   Result Value Ref Range    WBC 7.0 4.0 - 11.0 10e9/L    RBC Count 5.49 4.4 - 5.9 10e12/L    Hemoglobin 15.6 13.3 - 17.7 g/dL    Hematocrit 47.1 40.0 - 53.0 %    MCV 86 78 - 100 fl    MCH 28.4 26.5 - 33.0 pg    MCHC 33.1 31.5 - 36.5 g/dL    RDW 13.4 10.0 - 15.0 %    Platelet Count 323 150 - 450 10e9/L     Refer to diabetic ed     6. History of anemia  Recheck cbc  - CBC with platelets    Follow up 6 months sooner if worsening     Talia Bhandari M.D.

## 2021-02-10 DIAGNOSIS — Z79.891 LONG-TERM CURRENT USE OF OPIATE ANALGESIC: Chronic | ICD-10-CM

## 2021-02-10 DIAGNOSIS — M19.011 ARTHRITIS OF RIGHT ACROMIOCLAVICULAR JOINT: ICD-10-CM

## 2021-02-10 RX ORDER — HYDROCODONE BITARTRATE AND ACETAMINOPHEN 7.5; 325 MG/1; MG/1
1 TABLET ORAL EVERY 8 HOURS PRN
Qty: 34 TABLET | Refills: 0 | Status: SHIPPED | OUTPATIENT
Start: 2021-02-10 | End: 2021-03-08

## 2021-03-04 DIAGNOSIS — Z79.891 LONG-TERM CURRENT USE OF OPIATE ANALGESIC: Chronic | ICD-10-CM

## 2021-03-04 DIAGNOSIS — M19.011 ARTHRITIS OF RIGHT ACROMIOCLAVICULAR JOINT: ICD-10-CM

## 2021-03-08 RX ORDER — HYDROCODONE BITARTRATE AND ACETAMINOPHEN 7.5; 325 MG/1; MG/1
1 TABLET ORAL EVERY 8 HOURS PRN
Qty: 34 TABLET | Refills: 0 | Status: SHIPPED | OUTPATIENT
Start: 2021-03-08 | End: 2021-04-09

## 2021-04-09 DIAGNOSIS — Z79.891 LONG-TERM CURRENT USE OF OPIATE ANALGESIC: Chronic | ICD-10-CM

## 2021-04-09 DIAGNOSIS — M19.011 ARTHRITIS OF RIGHT ACROMIOCLAVICULAR JOINT: ICD-10-CM

## 2021-04-09 RX ORDER — HYDROCODONE BITARTRATE AND ACETAMINOPHEN 7.5; 325 MG/1; MG/1
1 TABLET ORAL EVERY 8 HOURS PRN
Qty: 33 TABLET | Refills: 0 | Status: SHIPPED | OUTPATIENT
Start: 2021-04-09 | End: 2021-05-14

## 2021-04-09 NOTE — TELEPHONE ENCOUNTER
Norco    (late call - he is sorry)  Last Written Prescription Date:  3/8/21  Last Fill Quantity: 34,   # refills: 0  Last Office Visit: 1/7/21  Future Office visit:       Routing refill request to provider for review/approval because:  Drug not on the [facility], [facility] or [facility] refill protocol or controlled substance

## 2021-05-14 ENCOUNTER — TELEPHONE (OUTPATIENT)
Dept: FAMILY MEDICINE | Facility: CLINIC | Age: 56
End: 2021-05-14

## 2021-05-14 DIAGNOSIS — Z79.891 LONG-TERM CURRENT USE OF OPIATE ANALGESIC: Chronic | ICD-10-CM

## 2021-05-14 DIAGNOSIS — M19.011 ARTHRITIS OF RIGHT ACROMIOCLAVICULAR JOINT: ICD-10-CM

## 2021-05-14 RX ORDER — HYDROCODONE BITARTRATE AND ACETAMINOPHEN 7.5; 325 MG/1; MG/1
1 TABLET ORAL EVERY 8 HOURS PRN
Qty: 30 TABLET | Refills: 0 | Status: SHIPPED | OUTPATIENT
Start: 2021-05-14 | End: 2021-06-10

## 2021-05-14 NOTE — TELEPHONE ENCOUNTER
Reason for Call:  Medication or medication refill:    Do you use a [facility]?  Name of the pharmacy and phone number for the current request:  [id number] [place]    Name of the medication requested: norco    Other request: none    Can we leave a detailed message on this number? YES    Phone number patient can be reached at: Cell number on file:    Telephone Information:   Mobile [phone]       Best Time: any    Call taken on 5/14/2021 at 10:31 AM by Elsy Stone

## 2021-06-10 DIAGNOSIS — M19.011 ARTHRITIS OF RIGHT ACROMIOCLAVICULAR JOINT: ICD-10-CM

## 2021-06-10 DIAGNOSIS — Z79.891 LONG-TERM CURRENT USE OF OPIATE ANALGESIC: Chronic | ICD-10-CM

## 2021-06-10 RX ORDER — HYDROCODONE BITARTRATE AND ACETAMINOPHEN 7.5; 325 MG/1; MG/1
1 TABLET ORAL EVERY 8 HOURS PRN
Qty: 30 TABLET | Refills: 0 | Status: SHIPPED | OUTPATIENT
Start: 2021-06-10 | End: 2021-07-08

## 2021-06-10 NOTE — TELEPHONE ENCOUNTER
Reason for Call:  Other call back    Detailed comments: Patient called for a refill on his pain med.    Phone Number Patient can be reached at: Cell number on file:    Telephone Information:   Mobile [phone]       Best Time:     Can we leave a detailed message on this number? YES    Call taken on 6/10/2021 at 3:30 PM by Judy Kidd

## 2021-07-08 DIAGNOSIS — M19.011 ARTHRITIS OF RIGHT ACROMIOCLAVICULAR JOINT: ICD-10-CM

## 2021-07-08 DIAGNOSIS — Z79.891 LONG-TERM CURRENT USE OF OPIATE ANALGESIC: Chronic | ICD-10-CM

## 2021-07-08 RX ORDER — HYDROCODONE BITARTRATE AND ACETAMINOPHEN 7.5; 325 MG/1; MG/1
1 TABLET ORAL EVERY 8 HOURS PRN
Qty: 30 TABLET | Refills: 0 | Status: SHIPPED | OUTPATIENT
Start: 2021-07-08 | End: 2021-08-10

## 2021-08-09 DIAGNOSIS — M19.011 ARTHRITIS OF RIGHT ACROMIOCLAVICULAR JOINT: ICD-10-CM

## 2021-08-09 DIAGNOSIS — Z79.891 LONG-TERM CURRENT USE OF OPIATE ANALGESIC: Chronic | ICD-10-CM

## 2021-08-10 NOTE — TELEPHONE ENCOUNTER
Patient notified and states he ran out of med yesterday.  He does not have insurance currently and is wondering if he can do a virtual visit instead of in person.   Susie Cormier, CMA

## 2021-08-16 RX ORDER — HYDROCODONE BITARTRATE AND ACETAMINOPHEN 7.5; 325 MG/1; MG/1
1 TABLET ORAL EVERY 8 HOURS PRN
Qty: 30 TABLET | Refills: 0 | Status: SHIPPED | OUTPATIENT
Start: 2021-08-16

## 2021-08-16 NOTE — TELEPHONE ENCOUNTER
Patient calling again wondering if he can do a virtual visit as he lives in [place]. Please see previous notes.    Thank you,    CYNDI Hicks

## 2021-08-17 NOTE — TELEPHONE ENCOUNTER
Please call patient. Does he need help obtaining insurance? His blood pressure has not been under control for a while. He is due for urine drug screen. I can send in 1 refill but there are no further. As he has moved so far away, he may want to try [place] to be seen. Controlled substances require some face to face visits and he is due for other health maintenance. Talia Bhandari M.D.

## 2022-01-15 ENCOUNTER — HEALTH MAINTENANCE LETTER (OUTPATIENT)
Age: 57
End: 2022-01-15

## 2022-06-05 NOTE — TELEPHONE ENCOUNTER
Requested Prescriptions   Pending Prescriptions Disp Refills     HYDROcodone-acetaminophen (NORCO) 7.5-325 MG per tablet 52 tablet 0     Sig: Take 1 tablet by mouth every 8 hours as needed for moderate to severe pain       There is no refill protocol information for this order        Olga Dumont  [facility]      no blurred vision/no change in level of consciousness/no confusion/no dizziness/no fever/no loss of consciousness/no numbness

## 2023-01-30 NOTE — TELEPHONE ENCOUNTER
Medication Question or Refill    Who is calling: pt    What medication are you calling about (include dose and sig)?:   HYDROcodone-acetaminophen (NORCO) 7.5-325 MG per tablet    Controlled Substance Agreement on file: No    Who prescribed the medication?: pcp    Do you need a refill? Yes:     When did you use the medication last? 5 days ago    Patient offered an appointment? No    Do you have any questions or concerns?  No    Requested Pharmacy: in chart    Okay to leave a detailed message?: Yes at Cell number on file:    Telephone Information:   Mobile [phone]                      Yes

## 2023-04-16 ENCOUNTER — HEALTH MAINTENANCE LETTER (OUTPATIENT)
Age: 58
End: 2023-04-16

## 2023-10-15 NOTE — TELEPHONE ENCOUNTER
Norco   (refill for next week)      Last Written Prescription Date:  2/10/21  Last Fill Quantity: 34,   # refills: 0  Last Office Visit: 1/7/21  Future Office visit:       Routing refill request to provider for review/approval because:  Drug not on the [facility], [facility] or [facility] refill protocol or controlled substance     High High High

## 2024-06-23 ENCOUNTER — HEALTH MAINTENANCE LETTER (OUTPATIENT)
Age: 59
End: 2024-06-23

## (undated) DEVICE — SUCTION MANIFOLD DORNOCH ULTRA CART UL-CL500

## (undated) DEVICE — DECANTER VIAL 2006S

## (undated) DEVICE — DRAPE O ARM TUBE 9732722

## (undated) DEVICE — EYE SHIELD CORNEAL CROUCH  E5699

## (undated) DEVICE — ESU GROUND PAD ADULT W/CORD E7507

## (undated) DEVICE — ESU NDL COLORADO MICRO E1651

## (undated) DEVICE — LINEN TOWEL PACK X5 5464

## (undated) DEVICE — PREP SKIN SCRUB TRAY 4461A

## (undated) DEVICE — PREP POVIDONE IODINE SCRUB 7.5% 120ML

## (undated) DEVICE — SPONGE COTTONOID 1/2X1/2" 20-04S

## (undated) DEVICE — LINEN TOWEL PACK X6 WHITE 5487

## (undated) DEVICE — ESU ELEC NDL 1" COATED/INSULATED E1465

## (undated) DEVICE — ANTIFOG SOLUTION W/FOAM PAD 31142527

## (undated) DEVICE — PACK NEURO MINOR UMMC SNE32MNMU4

## (undated) DEVICE — SPONGE COTTONOID 1/2X3" 20-07S

## (undated) DEVICE — PREP POVIDONE IODINE SOLUTION 10% 120ML

## (undated) DEVICE — SOL NACL 0.9% IRRIG 1000ML BOTTLE 2F7124

## (undated) RX ORDER — FENTANYL CITRATE 50 UG/ML
INJECTION, SOLUTION INTRAMUSCULAR; INTRAVENOUS
Status: DISPENSED
Start: 2017-09-05

## (undated) RX ORDER — ONDANSETRON 2 MG/ML
INJECTION INTRAMUSCULAR; INTRAVENOUS
Status: DISPENSED
Start: 2017-09-05

## (undated) RX ORDER — OXYMETAZOLINE HYDROCHLORIDE 0.05 G/100ML
SPRAY NASAL
Status: DISPENSED
Start: 2017-09-05

## (undated) RX ORDER — LIDOCAINE HYDROCHLORIDE AND EPINEPHRINE 10; 10 MG/ML; UG/ML
INJECTION, SOLUTION INFILTRATION; PERINEURAL
Status: DISPENSED
Start: 2017-09-05